# Patient Record
Sex: FEMALE | Race: WHITE | NOT HISPANIC OR LATINO | Employment: FULL TIME | URBAN - METROPOLITAN AREA
[De-identification: names, ages, dates, MRNs, and addresses within clinical notes are randomized per-mention and may not be internally consistent; named-entity substitution may affect disease eponyms.]

---

## 2022-05-19 ENCOUNTER — OFFICE VISIT (OUTPATIENT)
Dept: OBSTETRICS AND GYNECOLOGY | Facility: CLINIC | Age: 22
End: 2022-05-19
Payer: MEDICAID

## 2022-05-19 VITALS
SYSTOLIC BLOOD PRESSURE: 106 MMHG | BODY MASS INDEX: 27.88 KG/M2 | HEIGHT: 60 IN | DIASTOLIC BLOOD PRESSURE: 70 MMHG | WEIGHT: 142 LBS

## 2022-05-19 DIAGNOSIS — Z34.90 PREGNANCY, UNSPECIFIED GESTATIONAL AGE: Primary | ICD-10-CM

## 2022-05-19 DIAGNOSIS — O21.9 NAUSEA AND VOMITING IN PREGNANCY: ICD-10-CM

## 2022-05-19 PROCEDURE — 1160F PR REVIEW ALL MEDS BY PRESCRIBER/CLIN PHARMACIST DOCUMENTED: ICD-10-PCS | Mod: CPTII,S$GLB,, | Performed by: NURSE PRACTITIONER

## 2022-05-19 PROCEDURE — 1159F PR MEDICATION LIST DOCUMENTED IN MEDICAL RECORD: ICD-10-PCS | Mod: CPTII,S$GLB,, | Performed by: NURSE PRACTITIONER

## 2022-05-19 PROCEDURE — 3078F DIAST BP <80 MM HG: CPT | Mod: CPTII,S$GLB,, | Performed by: NURSE PRACTITIONER

## 2022-05-19 PROCEDURE — 3078F PR MOST RECENT DIASTOLIC BLOOD PRESSURE < 80 MM HG: ICD-10-PCS | Mod: CPTII,S$GLB,, | Performed by: NURSE PRACTITIONER

## 2022-05-19 PROCEDURE — 1160F RVW MEDS BY RX/DR IN RCRD: CPT | Mod: CPTII,S$GLB,, | Performed by: NURSE PRACTITIONER

## 2022-05-19 PROCEDURE — 3074F PR MOST RECENT SYSTOLIC BLOOD PRESSURE < 130 MM HG: ICD-10-PCS | Mod: CPTII,S$GLB,, | Performed by: NURSE PRACTITIONER

## 2022-05-19 PROCEDURE — 3008F BODY MASS INDEX DOCD: CPT | Mod: CPTII,S$GLB,, | Performed by: NURSE PRACTITIONER

## 2022-05-19 PROCEDURE — 99203 OFFICE O/P NEW LOW 30 MIN: CPT | Mod: TH,S$GLB,, | Performed by: NURSE PRACTITIONER

## 2022-05-19 PROCEDURE — 1159F MED LIST DOCD IN RCRD: CPT | Mod: CPTII,S$GLB,, | Performed by: NURSE PRACTITIONER

## 2022-05-19 PROCEDURE — 99203 PR OFFICE/OUTPT VISIT, NEW, LEVL III, 30-44 MIN: ICD-10-PCS | Mod: TH,S$GLB,, | Performed by: NURSE PRACTITIONER

## 2022-05-19 PROCEDURE — 3008F PR BODY MASS INDEX (BMI) DOCUMENTED: ICD-10-PCS | Mod: CPTII,S$GLB,, | Performed by: NURSE PRACTITIONER

## 2022-05-19 PROCEDURE — 3074F SYST BP LT 130 MM HG: CPT | Mod: CPTII,S$GLB,, | Performed by: NURSE PRACTITIONER

## 2022-05-19 RX ORDER — ONDANSETRON 4 MG/1
4 TABLET, ORALLY DISINTEGRATING ORAL EVERY 6 HOURS PRN
Qty: 30 TABLET | Refills: 3 | Status: SHIPPED | OUTPATIENT
Start: 2022-05-19 | End: 2022-05-19 | Stop reason: SDUPTHER

## 2022-05-19 RX ORDER — ONDANSETRON 4 MG/1
4 TABLET, ORALLY DISINTEGRATING ORAL EVERY 6 HOURS PRN
Qty: 30 TABLET | Refills: 3 | Status: SHIPPED | OUTPATIENT
Start: 2022-05-19 | End: 2022-08-18

## 2022-05-19 NOTE — PROGRESS NOTES
CC: Absence of menses    Boris Arambula is a 21 y.o. female  presents with complaint of absence of menstruation.  She reports nausea/vomIting/abdominal pain/bleeding.  UPT is positive. Patient's last menstrual period was 2022 (approximate). known.  Periods are last menstrual period 2022.  By LMP gestational age is 10 weeks 5 days with EDC of 12/10/2022.  Is taking PNV will start probiotics daily as well.      History reviewed. No pertinent past medical history.  History reviewed. No pertinent surgical history.  Social History     Socioeconomic History    Marital status: Single   Tobacco Use    Smoking status: Never Smoker    Smokeless tobacco: Never Used   Substance and Sexual Activity    Alcohol use: Not Currently    Drug use: Never    Sexual activity: Yes     Partners: Male     History reviewed. No pertinent family history.  OB History    Para Term  AB Living   1             SAB IAB Ectopic Multiple Live Births                  # Outcome Date GA Lbr Chito/2nd Weight Sex Delivery Anes PTL Lv   1 Current                /70   Ht 5' (1.524 m)   Wt 64.4 kg (142 lb)   LMP 2022 (Approximate)   BMI 27.73 kg/m²     ROS:  GENERAL: Denies weight gain or weight loss. Feeling well overall.   SKIN: Denies rash or lesions.   HEAD: Denies head injury or headache.   NODES: Denies enlarged lymph nodes.   CHEST: Denies chest pain or shortness of breath.   CARDIOVASCULAR: Denies palpitations or left sided chest pain.   ABDOMEN: No abdominal pain, constipation, diarrhea, nausea, vomiting or rectal bleeding.   URINARY: No frequency, dysuria, hematuria, or burning on urination.  REPRODUCTIVE: See HPI.   BREASTS: The patient performs breast self-examination and denies pain, lumps, or nipple discharge.   HEMATOLOGIC: No easy bruisability or excessive bleeding.   MUSCULOSKELETAL: Denies joint pain or swelling.   NEUROLOGIC: Denies syncope or weakness.   PSYCHIATRIC: Denies depression,  anxiety or mood swings.    PE:   APPEARANCE: Well nourished, well developed, in no acute distress.  AFFECT: WNL, alert and oriented x 3.  SKIN: No acne or hirsutism.  NECK: Neck symmetric without masses or thyromegaly.  NODES: No inguinal, cervical, axillary or femoral lymph node enlargement.  CHEST: Good respiratory effort.   ABDOMEN: Soft. No tenderness or masses. No hepatosplenomegaly. No hernias.  EXTREMITIES: No edema.      ASSESSMENT and PLAN:    ICD-10-CM ICD-9-CM    1. Pregnancy, unspecified gestational age  Z34.90 V22.2  OB/GYN Procedure ([facility])      ondansetron (ZOFRAN-ODT) 4 MG TbDL      DISCONTINUED: ondansetron (ZOFRAN-ODT) 4 MG TbDL   2. Nausea and vomiting in pregnancy  O21.9 643.90 ondansetron (ZOFRAN-ODT) 4 MG TbDL      DISCONTINUED: ondansetron (ZOFRAN-ODT) 4 MG TbDL         Patient was counseled today on proper weight gain based on the [employer] [facility]'s recommendations based on her pre-pregnancy weight. Discussed foods to avoid in pregnancy (i.e. sushi, fish that are high in mercury, deli meat, and unpasteurized cheeses). Discussed prenatal vitamin options (i.e. stool softener, DHA). Contingency screen offered - patient desires. Initial ob with labs in 2 weeks.     No follow-ups on file.    Annmarie Garrett, MARYP-C

## 2022-05-27 ENCOUNTER — PROCEDURE VISIT (OUTPATIENT)
Dept: OBSTETRICS AND GYNECOLOGY | Facility: CLINIC | Age: 22
End: 2022-05-27
Payer: MEDICAID

## 2022-05-27 DIAGNOSIS — Z34.90 PREGNANCY, UNSPECIFIED GESTATIONAL AGE: ICD-10-CM

## 2022-05-27 PROCEDURE — 76817 TRANSVAGINAL US OBSTETRIC: CPT | Mod: S$GLB,,, | Performed by: OBSTETRICS & GYNECOLOGY

## 2022-05-27 PROCEDURE — 76801 OB US < 14 WKS SINGLE FETUS: CPT | Mod: S$GLB,,, | Performed by: OBSTETRICS & GYNECOLOGY

## 2022-05-27 PROCEDURE — 76801 US OB/GYN PROCEDURE (VIEWPOINT): ICD-10-PCS | Mod: S$GLB,,, | Performed by: OBSTETRICS & GYNECOLOGY

## 2022-05-27 PROCEDURE — 76817 US OB/GYN PROCEDURE (VIEWPOINT): ICD-10-PCS | Mod: S$GLB,,, | Performed by: OBSTETRICS & GYNECOLOGY

## 2022-06-03 ENCOUNTER — PATIENT MESSAGE (OUTPATIENT)
Dept: OBSTETRICS AND GYNECOLOGY | Facility: CLINIC | Age: 22
End: 2022-06-03

## 2022-06-03 ENCOUNTER — ROUTINE PRENATAL (OUTPATIENT)
Dept: OBSTETRICS AND GYNECOLOGY | Facility: CLINIC | Age: 22
End: 2022-06-03
Payer: MEDICAID

## 2022-06-03 ENCOUNTER — LAB VISIT (OUTPATIENT)
Dept: LAB | Facility: CLINIC | Age: 22
End: 2022-06-03
Payer: MEDICAID

## 2022-06-03 VITALS
BODY MASS INDEX: 27.91 KG/M2 | DIASTOLIC BLOOD PRESSURE: 64 MMHG | WEIGHT: 142.88 LBS | HEART RATE: 84 BPM | SYSTOLIC BLOOD PRESSURE: 121 MMHG

## 2022-06-03 DIAGNOSIS — Z3A.10 10 WEEKS GESTATION OF PREGNANCY: Primary | ICD-10-CM

## 2022-06-03 DIAGNOSIS — Z3A.10 10 WEEKS GESTATION OF PREGNANCY: ICD-10-CM

## 2022-06-03 PROBLEM — Z3A.09 9 WEEKS GESTATION OF PREGNANCY: Status: ACTIVE | Noted: 2022-06-03

## 2022-06-03 LAB
ABO + RH BLD: NORMAL
AMPHET+METHAMPHET UR QL: NEGATIVE
BARBITURATES UR QL SCN>200 NG/ML: NEGATIVE
BASOPHILS # BLD AUTO: 0.04 K/UL (ref 0–0.2)
BASOPHILS NFR BLD: 0.3 % (ref 0–1.9)
BENZODIAZ UR QL SCN>200 NG/ML: NEGATIVE
BLD GP AB SCN CELLS X3 SERPL QL: NORMAL
BZE UR QL SCN: NEGATIVE
CANNABINOIDS UR QL SCN: NEGATIVE
CREAT UR-MCNC: 110.1 MG/DL (ref 15–325)
DIFFERENTIAL METHOD: ABNORMAL
EOSINOPHIL # BLD AUTO: 0.4 K/UL (ref 0–0.5)
EOSINOPHIL NFR BLD: 3.3 % (ref 0–8)
ERYTHROCYTE [DISTWIDTH] IN BLOOD BY AUTOMATED COUNT: 13.3 % (ref 11.5–14.5)
HCT VFR BLD AUTO: 39.9 % (ref 37–48.5)
HGB BLD-MCNC: 13.5 G/DL (ref 12–16)
IMM GRANULOCYTES # BLD AUTO: 0.04 K/UL (ref 0–0.04)
IMM GRANULOCYTES NFR BLD AUTO: 0.3 % (ref 0–0.5)
LYMPHOCYTES # BLD AUTO: 2.6 K/UL (ref 1–4.8)
LYMPHOCYTES NFR BLD: 22.3 % (ref 18–48)
MCH RBC QN AUTO: 28.7 PG (ref 27–31)
MCHC RBC AUTO-ENTMCNC: 33.8 G/DL (ref 32–36)
MCV RBC AUTO: 85 FL (ref 82–98)
METHADONE UR QL SCN>300 NG/ML: NEGATIVE
MONOCYTES # BLD AUTO: 0.7 K/UL (ref 0.3–1)
MONOCYTES NFR BLD: 6.2 % (ref 4–15)
NEUTROPHILS # BLD AUTO: 7.9 K/UL (ref 1.8–7.7)
NEUTROPHILS NFR BLD: 67.6 % (ref 38–73)
NRBC BLD-RTO: 0 /100 WBC
OPIATES UR QL SCN: NEGATIVE
PCP UR QL SCN>25 NG/ML: NEGATIVE
PLATELET # BLD AUTO: 397 K/UL (ref 150–450)
PMV BLD AUTO: 9.7 FL (ref 9.2–12.9)
RBC # BLD AUTO: 4.7 M/UL (ref 4–5.4)
TOXICOLOGY INFORMATION: NORMAL
WBC # BLD AUTO: 11.69 K/UL (ref 3.9–12.7)

## 2022-06-03 PROCEDURE — 99213 PR OFFICE/OUTPT VISIT, EST, LEVL III, 20-29 MIN: ICD-10-PCS | Mod: TH,S$GLB,, | Performed by: ADVANCED PRACTICE MIDWIFE

## 2022-06-03 PROCEDURE — 85660 RBC SICKLE CELL TEST: CPT | Performed by: ADVANCED PRACTICE MIDWIFE

## 2022-06-03 PROCEDURE — 86592 SYPHILIS TEST NON-TREP QUAL: CPT | Performed by: ADVANCED PRACTICE MIDWIFE

## 2022-06-03 PROCEDURE — 87186 SC STD MICRODIL/AGAR DIL: CPT | Performed by: ADVANCED PRACTICE MIDWIFE

## 2022-06-03 PROCEDURE — 87088 URINE BACTERIA CULTURE: CPT | Performed by: ADVANCED PRACTICE MIDWIFE

## 2022-06-03 PROCEDURE — 87591 N.GONORRHOEAE DNA AMP PROB: CPT | Performed by: ADVANCED PRACTICE MIDWIFE

## 2022-06-03 PROCEDURE — 87491 CHLMYD TRACH DNA AMP PROBE: CPT | Performed by: ADVANCED PRACTICE MIDWIFE

## 2022-06-03 PROCEDURE — 80307 DRUG TEST PRSMV CHEM ANLYZR: CPT | Performed by: ADVANCED PRACTICE MIDWIFE

## 2022-06-03 PROCEDURE — 99213 OFFICE O/P EST LOW 20 MIN: CPT | Mod: TH,S$GLB,, | Performed by: ADVANCED PRACTICE MIDWIFE

## 2022-06-03 PROCEDURE — 87389 HIV-1 AG W/HIV-1&-2 AB AG IA: CPT | Performed by: ADVANCED PRACTICE MIDWIFE

## 2022-06-03 PROCEDURE — 85025 COMPLETE CBC W/AUTO DIFF WBC: CPT | Performed by: ADVANCED PRACTICE MIDWIFE

## 2022-06-03 PROCEDURE — 36415 COLL VENOUS BLD VENIPUNCTURE: CPT | Mod: ,,, | Performed by: ADVANCED PRACTICE MIDWIFE

## 2022-06-03 PROCEDURE — 86901 BLOOD TYPING SEROLOGIC RH(D): CPT | Performed by: ADVANCED PRACTICE MIDWIFE

## 2022-06-03 PROCEDURE — 80074 ACUTE HEPATITIS PANEL: CPT | Performed by: ADVANCED PRACTICE MIDWIFE

## 2022-06-03 PROCEDURE — 86762 RUBELLA ANTIBODY: CPT | Performed by: ADVANCED PRACTICE MIDWIFE

## 2022-06-03 PROCEDURE — 87077 CULTURE AEROBIC IDENTIFY: CPT | Performed by: ADVANCED PRACTICE MIDWIFE

## 2022-06-03 PROCEDURE — 36415 PR COLLECTION VENOUS BLOOD,VENIPUNCTURE: ICD-10-PCS | Mod: ,,, | Performed by: ADVANCED PRACTICE MIDWIFE

## 2022-06-03 PROCEDURE — 87086 URINE CULTURE/COLONY COUNT: CPT | Performed by: ADVANCED PRACTICE MIDWIFE

## 2022-06-03 NOTE — PROGRESS NOTES
6/3/2022  21 y.o.  at 10w0d Estimated Date of Delivery: 22, per US at 9 weeks. EDC changed per US.  OB History    Para Term  AB Living   1             SAB IAB Ectopic Multiple Live Births                  # Outcome Date GA Lbr Chito/2nd Weight Sex Delivery Anes PTL Lv   1 Current                Here for scheduled TERRENCE visit. Doing well.  No lof/brvb, dysuria, fever/chills, or abdominal pain. Mild nausea. Calm, pleasant, NAD. ROS negative with exception of aforementioned:    History  OBHX:    PMXHX:  Denies major illness/injury  ALLERGY:  Amoxicillin  SURGHX:  None  SOCHX:  FOB involved  Denies tobacco, etoh or substance use in pregnancy.    Review of Systems:  General ROS: negative for headache or visual changes  Breast ROS: negative for breast lumps  Gastrointestinal ROS: negative for constipation, diarrhea or nausea/vomiting  Musculoskeletal ROS: negative for pain in joints or swelling in face or hands.   Neurological ROS: negative for - headaches, numbness/tingling or visual changes      Physical Exam:  /64   Pulse 84   Wt 64.8 kg (142 lb 14.4 oz)   LMP 2022 (Approximate)   BMI 27.91 kg/m²   FHT:  160s    Constitutional: She is oriented to person, place, and time. She appears well-developed and well-nourished. No distress.   Pulmonary/Chest: Effort normal. No respiratory distress  Abdominal: Soft, gravid, nontender. No rebound and no guarding. Fundal Height:  S=D  Genitourinary: Deferred   Musculoskeletal: Normal range of motion. Minimal peripheral edema.   Neurological: She is alert and oriented to person, place, and time. Coordination normal. Gait smooth and steady  Skin: Skin is warm and dry. She is not diaphoretic.  Psychiatric: She has a normal mood and affect.      Assessment:   21 y.o., at 10w0d Gestation   Patient Active Problem List   Diagnosis    9 weeks gestation of pregnancy    10 weeks gestation of pregnancy     Current Outpatient Medications on File  Prior to Visit   Medication Sig Dispense Refill    ondansetron (ZOFRAN-ODT) 4 MG TbDL Take 1 tablet (4 mg total) by mouth every 6 (six) hours as needed (nausea). 30 tablet 3     No current facility-administered medications on file prior to visit.       Plan:  1. S&S of SAB reinforced.  2. Discussed our collaborative practice and CNM care.  3. Continue home meds/daily PNV.  4. All new ob labs with Pan[facility]/desires.  5. TERRENCE in 3 weeks.

## 2022-06-04 LAB
HGB S BLD QL SOLY: NEGATIVE
RPR SER QL: NORMAL

## 2022-06-05 LAB
C TRACH DNA SPEC QL NAA+PROBE: NOT DETECTED
N GONORRHOEA DNA SPEC QL NAA+PROBE: NOT DETECTED

## 2022-06-06 ENCOUNTER — TELEPHONE (OUTPATIENT)
Dept: OBSTETRICS AND GYNECOLOGY | Facility: CLINIC | Age: 22
End: 2022-06-06
Payer: MEDICAID

## 2022-06-06 DIAGNOSIS — O23.40 URINARY TRACT INFECTION IN MOTHER DURING PREGNANCY, ANTEPARTUM: Primary | ICD-10-CM

## 2022-06-06 LAB
BACTERIA UR CULT: ABNORMAL
HAV IGM SERPL QL IA: NEGATIVE
HBV CORE IGM SERPL QL IA: NEGATIVE
HBV SURFACE AG SERPL QL IA: NEGATIVE
HCV AB SERPL QL IA: NEGATIVE
HIV 1+2 AB+HIV1 P24 AG SERPL QL IA: NEGATIVE
RUBV IGG SER-ACNC: 16.5 IU/ML
RUBV IGG SER-IMP: REACTIVE

## 2022-06-06 RX ORDER — NITROFURANTOIN 25; 75 MG/1; MG/1
100 CAPSULE ORAL 2 TIMES DAILY
Qty: 14 CAPSULE | Refills: 0 | Status: SHIPPED | OUTPATIENT
Start: 2022-06-06 | End: 2022-06-13

## 2022-06-06 NOTE — TELEPHONE ENCOUNTER
Attempted to call patient and the phone on file is disconnected     ----- Message from Viridiana Hanson CNM sent at 6/6/2022  1:32 PM CDT -----  Please inform patient of UTI. RX for Macrobid has been sent to pharmacy on file for  asap.

## 2022-06-17 ENCOUNTER — PATIENT MESSAGE (OUTPATIENT)
Dept: OBSTETRICS AND GYNECOLOGY | Facility: CLINIC | Age: 22
End: 2022-06-17
Payer: MEDICAID

## 2022-06-23 ENCOUNTER — TELEPHONE (OUTPATIENT)
Dept: OBSTETRICS AND GYNECOLOGY | Facility: CLINIC | Age: 22
End: 2022-06-23

## 2022-06-29 ENCOUNTER — PATIENT MESSAGE (OUTPATIENT)
Dept: OBSTETRICS AND GYNECOLOGY | Facility: CLINIC | Age: 22
End: 2022-06-29

## 2022-06-29 ENCOUNTER — ROUTINE PRENATAL (OUTPATIENT)
Dept: OBSTETRICS AND GYNECOLOGY | Facility: CLINIC | Age: 22
End: 2022-06-29
Payer: MEDICAID

## 2022-06-29 VITALS — SYSTOLIC BLOOD PRESSURE: 110 MMHG | BODY MASS INDEX: 28.22 KG/M2 | WEIGHT: 144.5 LBS | DIASTOLIC BLOOD PRESSURE: 61 MMHG

## 2022-06-29 DIAGNOSIS — Z3A.13 13 WEEKS GESTATION OF PREGNANCY: Primary | ICD-10-CM

## 2022-06-29 PROCEDURE — 99213 PR OFFICE/OUTPT VISIT, EST, LEVL III, 20-29 MIN: ICD-10-PCS | Mod: TH,S$GLB,, | Performed by: NURSE PRACTITIONER

## 2022-06-29 PROCEDURE — 99213 OFFICE O/P EST LOW 20 MIN: CPT | Mod: TH,S$GLB,, | Performed by: NURSE PRACTITIONER

## 2022-06-29 RX ORDER — NITROFURANTOIN 25; 75 MG/1; MG/1
100 CAPSULE ORAL 2 TIMES DAILY
Qty: 14 CAPSULE | Refills: 0 | Status: SHIPPED | OUTPATIENT
Start: 2022-06-29 | End: 2022-07-06

## 2022-06-29 NOTE — PROGRESS NOTES
2022  22 y.o. 13w5d Estimated Date of Delivery: 22, dating reviewed.   OB History    Para Term  AB Living   1             SAB IAB Ectopic Multiple Live Births                  # Outcome Date GA Lbr Chito/2nd Weight Sex Delivery Anes PTL Lv   1 Current              The patient presents with complaints of   Chief Complaint   Patient presents with    Routine Prenatal Visit     Pt is here for her Routine OB [id number].Pt has no complaints.       Reports: Good fetal movements reported. No Bleeding or contractions .  She is taking prenatal vitamins. Ms. Arambula   is adjusting well and has a good support system of family and friends. She is coping with pregnancy and having no difficulty with sleep.    Prenatal labs done 6/3/22    Review of Systems:  General ROS: negative for headache or visual changes  Breast ROS: negative for breast lumps  Gastrointestinal ROS: negative for constipation, diarrhea or nausea/vomiting  Musculoskeletal ROS: negative for pain in joints or swelling in face or hands.   Neurological ROS: negative for - headaches, numbness/tingling or visual changes      Physical Exam:  /61   Wt 65.5 kg (144 lb 8 oz)   LMP 2022 (Approximate)   BMI 28.22 kg/m²   Urine Dip:  Protein negative Glucose negative    Constitutional: She is oriented to person, place, and time. She appears well-developed and well-nourished. No distress.     Pulmonary/Chest: Effort normal. No respiratory distress  Abdominal: Soft, gravid, nontender. No rebound and no guarding.   Genitourinary: Deferred   Musculoskeletal: Normal range of motion, Minimal peripheral edema.   Neurological: She is alert and oriented to person, place, and time. Coordination normal.   Skin: Skin is warm and dry. She is not diaphoretic.  Psychiatric: She has a normal mood and affect.        Assessment:  Overall doing well.   22 y.o., at 13w5d Gestation   Patient Active Problem List   Diagnosis    9 weeks gestation of pregnancy     10 weeks gestation of pregnancy    Urinary tract infection in mother during pregnancy, antepartum     Current Outpatient Medications on File Prior to Visit   Medication Sig Dispense Refill    ondansetron (ZOFRAN-ODT) 4 MG TbDL Take 1 tablet (4 mg total) by mouth every 6 (six) hours as needed (nausea). 30 tablet 3     No current facility-administered medications on file prior to visit.       13 weeks gestation of pregnancy         Plan:  Overall doing well    Follow up 3 Weeks, bleeding/pain precautions, kick counts, labor precautions discussed. Will get Macrobid for UTI.  TERRENCE in 3 weeks with Ms Kemp.     Annmarie Garrett, FNP-C

## 2022-07-14 ENCOUNTER — PATIENT MESSAGE (OUTPATIENT)
Dept: OBSTETRICS AND GYNECOLOGY | Facility: CLINIC | Age: 22
End: 2022-07-14
Payer: MEDICAID

## 2022-07-21 ENCOUNTER — ROUTINE PRENATAL (OUTPATIENT)
Dept: OBSTETRICS AND GYNECOLOGY | Facility: CLINIC | Age: 22
End: 2022-07-21
Payer: MEDICAID

## 2022-07-21 VITALS — WEIGHT: 148 LBS | SYSTOLIC BLOOD PRESSURE: 118 MMHG | DIASTOLIC BLOOD PRESSURE: 70 MMHG | BODY MASS INDEX: 28.9 KG/M2

## 2022-07-21 DIAGNOSIS — Z3A.16 16 WEEKS GESTATION OF PREGNANCY: Primary | ICD-10-CM

## 2022-07-21 PROBLEM — Z3A.10 10 WEEKS GESTATION OF PREGNANCY: Status: RESOLVED | Noted: 2022-06-03 | Resolved: 2022-07-21

## 2022-07-21 PROBLEM — Z3A.09 9 WEEKS GESTATION OF PREGNANCY: Status: RESOLVED | Noted: 2022-06-03 | Resolved: 2022-07-21

## 2022-07-21 PROCEDURE — 59425 ANTEPARTUM CARE ONLY: CPT | Mod: TH,S$GLB,, | Performed by: ADVANCED PRACTICE MIDWIFE

## 2022-07-21 PROCEDURE — 87086 URINE CULTURE/COLONY COUNT: CPT | Performed by: ADVANCED PRACTICE MIDWIFE

## 2022-07-21 PROCEDURE — 59425 PR ANTEPARTUM CARE ONLY, 4-6 VISITS: ICD-10-PCS | Mod: TH,S$GLB,, | Performed by: ADVANCED PRACTICE MIDWIFE

## 2022-07-21 RX ORDER — LORATADINE 10 MG/1
10 TABLET ORAL
COMMUNITY

## 2022-07-21 NOTE — PROGRESS NOTES
2022  22 y.o.  at 16 + 6/7 d Estimated Date of Delivery: 22, per US at 9 weeks. EDC changed per US.  OB History    Para Term  AB Living   1             SAB IAB Ectopic Multiple Live Births                  # Outcome Date GA Lbr Chito/2nd Weight Sex Delivery Anes PTL Lv   1 Current                Here for scheduled TERRENCE visit. Doing well.  No lof/brvb, dysuria, fever/chills, or abdominal pain. + quickening. Calm, pleasant, NAD. ROS negative with exception of aforementioned:    History  OBHX:    + UTI, porsche=  PMXHX:  Denies major illness/injury  ALLERGY:  Amoxicillin  SURGHX:  None  SOCHX:  FOB involved  Denies tobacco, etoh or substance use in pregnancy.    LABS:  O pos abs neg  HIV neg  Rubella immune  RPR non reactive  Sickle Screen neg  HEP A B C neg  +UTI, porsche =  Neg GC CHL  Panorama low risk male x 3  AFP pending      Review of Systems:  General ROS: negative for headache or visual changes  Breast ROS: negative for breast lumps  Gastrointestinal ROS: negative for constipation, diarrhea or nausea/vomiting  Musculoskeletal ROS: negative for pain in joints or swelling in face or hands.   Neurological ROS: negative for - headaches, numbness/tingling or visual changes      Physical Exam:  /70   Wt 67.1 kg (148 lb)   LMP 2022 (Approximate)   BMI 28.90 kg/m²   FHT: Fetal Heart Rate: [id number]    Constitutional: She is oriented to person, place, and time. She appears well-developed and well-nourished. No distress.   Pulmonary/Chest: Effort normal. No respiratory distress  Abdominal: Soft, gravid, nontender. No rebound and no guarding. Fundal Height:  S=D 3 below U  Genitourinary: Deferred   Musculoskeletal: Normal range of motion. Minimal peripheral edema.   Neurological: She is alert and oriented to person, place, and time. Coordination normal. Gait smooth and steady  Skin: Skin is warm and dry. She is not diaphoretic.  Psychiatric: She has a normal mood and  affect.      Assessment:   22 y.o., at 16 +6w0d Gestation   Patient Active Problem List   Diagnosis    Urinary tract infection in mother during pregnancy, antepartum     Current Outpatient Medications on File Prior to Visit   Medication Sig Dispense Refill    loratadine (CLARITIN) 10 mg tablet Take 10 mg by mouth.      prenatal vit/iron fum/folic ac (PRENATAL 1+1 ORAL) Take by mouth.      ondansetron (ZOFRAN-ODT) 4 MG TbDL Take 1 tablet (4 mg total) by mouth every 6 (six) hours as needed (nausea). (Patient not taking: Reported on 7/21/2022) 30 tablet 3     No current facility-administered medications on file prior to visit.       Plan:  1. S&S of SAB reinforced.  2.   Continue daily PNV.  3.   AFP today, discussed.  4.  VINH UTI, urine cx today.  5.   TERRENCE 4 weeks with dating US/discussed.

## 2022-07-24 LAB — BACTERIA UR CULT: NO GROWTH

## 2022-08-17 ENCOUNTER — PATIENT MESSAGE (OUTPATIENT)
Dept: OBSTETRICS AND GYNECOLOGY | Facility: CLINIC | Age: 22
End: 2022-08-17
Payer: MEDICAID

## 2022-08-18 ENCOUNTER — PROCEDURE VISIT (OUTPATIENT)
Dept: MATERNAL FETAL MEDICINE | Facility: CLINIC | Age: 22
End: 2022-08-18
Payer: MEDICAID

## 2022-08-18 ENCOUNTER — ROUTINE PRENATAL (OUTPATIENT)
Dept: OBSTETRICS AND GYNECOLOGY | Facility: CLINIC | Age: 22
End: 2022-08-18
Payer: MEDICAID

## 2022-08-18 ENCOUNTER — PATIENT MESSAGE (OUTPATIENT)
Dept: OBSTETRICS AND GYNECOLOGY | Facility: CLINIC | Age: 22
End: 2022-08-18

## 2022-08-18 VITALS — WEIGHT: 156.5 LBS | DIASTOLIC BLOOD PRESSURE: 67 MMHG | SYSTOLIC BLOOD PRESSURE: 120 MMHG | BODY MASS INDEX: 30.56 KG/M2

## 2022-08-18 DIAGNOSIS — Z3A.16 16 WEEKS GESTATION OF PREGNANCY: ICD-10-CM
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
## 2022-08-18 DIAGNOSIS — Z3A.20 20 WEEKS GESTATION OF PREGNANCY: Primary | ICD-10-CM

## 2022-08-18 PROBLEM — O23.40 URINARY TRACT INFECTION IN MOTHER DURING PREGNANCY, ANTEPARTUM: Status: RESOLVED | Noted: 2022-06-06 | Resolved: 2022-08-18

## 2022-08-18 PROCEDURE — 59425 ANTEPARTUM CARE ONLY: CPT | Mod: TH,S$GLB,,

## 2022-08-18 PROCEDURE — 76805 OB US >/= 14 WKS SNGL FETUS: CPT | Mod: S$GLB,,, | Performed by: OBSTETRICS & GYNECOLOGY

## 2022-08-18 PROCEDURE — 76805 US OB/GYN PROCEDURE (VIEWPOINT): ICD-10-PCS | Mod: S$GLB,,, | Performed by: OBSTETRICS & GYNECOLOGY

## 2022-08-18 PROCEDURE — 90715 TDAP VACCINE 7 YRS/> IM: CPT | Mod: S$GLB,,, | Performed by: OBSTETRICS & GYNECOLOGY

## 2022-08-18 PROCEDURE — 59425 PR ANTEPARTUM CARE ONLY, 4-6 VISITS: ICD-10-PCS | Mod: TH,S$GLB,,

## 2022-08-18 PROCEDURE — 90715 PR TDAP VACCINE >7 YO, IM: ICD-10-PCS | Mod: S$GLB,,, | Performed by: OBSTETRICS & GYNECOLOGY

## 2022-08-18 PROCEDURE — 90471 IMMUNIZATION ADMIN: CPT | Mod: S$GLB,,, | Performed by: OBSTETRICS & GYNECOLOGY

## 2022-08-18 PROCEDURE — 90471 PR IMMUNIZ ADMIN,1 SINGLE/COMB VAC/TOXOID: ICD-10-PCS | Mod: S$GLB,,, | Performed by: OBSTETRICS & GYNECOLOGY

## 2022-08-18 NOTE — PROGRESS NOTES
2022  22 y.o.  at 20 + 6/7 d Estimated Date of Delivery: 22, per US at 9 weeks. EDC changed per US.  OB History    Para Term  AB Living   1             SAB IAB Ectopic Multiple Live Births                  # Outcome Date GA Lbr Chito/2nd Weight Sex Delivery Anes PTL Lv   1 Current                Here for scheduled TERRENCE visit. Doing well.  No lof/brvb, dysuria, fever/chills, or abdominal pain. + quickening. Calm, pleasant, NAD. ROS negative with exception of aforementioned:    Pt accidentally stuck herself with heartworm testing needle at work. TDAP today. No STD testing required as it was for animal heartworm testing.     History  OBHX:    + UTI, porcshe= no growth    PMXHX:  Denies major illness/injury    ALLERGY:  Amoxicillin    SURGHX:  None    SOCHX:  FOB involved  Denies tobacco, etoh or substance use in pregnancy.    LABS:  O pos abs neg  HIV neg  Rubella immune  RPR non reactive  Sickle Screen neg  HEP A B C neg  +UTI, porsche = no growth  Neg GC CHL  Panorama low risk male x 3  AFP negative      Review of Systems:  General ROS: negative for headache or visual changes  Breast ROS: negative for breast lumps  Gastrointestinal ROS: negative for constipation, diarrhea or nausea/vomiting  Musculoskeletal ROS: negative for pain in joints or swelling in face or hands.   Neurological ROS: negative for - headaches, numbness/tingling or visual changes      Physical Exam:  /67   Wt 71 kg (156 lb 8 oz)   LMP 2022 (Approximate)   BMI 30.56 kg/m²   FHT:  150s    Constitutional: She is oriented to person, place, and time. She appears well-developed and well-nourished. No distress.   Pulmonary/Chest: Effort normal. No respiratory distress  Abdominal: Soft, gravid, nontender. No rebound and no guarding. Fundal Height:  S=D 3 below U  Genitourinary: Deferred   Musculoskeletal: Normal range of motion. Minimal peripheral edema.   Neurological: She is alert and oriented to person,  place, and time. Coordination normal. Gait smooth and steady  Skin: Skin is warm and dry. She is not diaphoretic.  Psychiatric: She has a normal mood and affect.      Assessment:   22 y.o., at 16 +6w0d Gestation   Patient Active Problem List   Diagnosis    Urinary tract infection in mother during pregnancy, antepartum     Current Outpatient Medications on File Prior to Visit   Medication Sig Dispense Refill    loratadine (CLARITIN) 10 mg tablet Take 10 mg by mouth.      ondansetron (ZOFRAN-ODT) 4 MG TbDL Take 1 tablet (4 mg total) by mouth every 6 (six) hours as needed (nausea). (Patient not taking: Reported on 7/21/2022) 30 tablet 3    prenatal vit/iron fum/folic ac (PRENATAL 1+1 ORAL) Take by mouth.       No current facility-administered medications on file prior to visit.       Plan:  1. S&S of SAB reinforced.  2.   Continue daily PNV.  3.   AFP negative  4.   Anatomy u/s today, preliminary report WNL.   5.   Pt has needle stick injury from used heartworm needle at work. TDAP today. Needle stick precautions reviewed.   5.   TERRENCE 4 weeks.

## 2022-09-16 ENCOUNTER — ROUTINE PRENATAL (OUTPATIENT)
Dept: OBSTETRICS AND GYNECOLOGY | Facility: CLINIC | Age: 22
End: 2022-09-16
Payer: MEDICAID

## 2022-09-16 VITALS
BODY MASS INDEX: 31.68 KG/M2 | DIASTOLIC BLOOD PRESSURE: 64 MMHG | SYSTOLIC BLOOD PRESSURE: 124 MMHG | WEIGHT: 162.19 LBS

## 2022-09-16 DIAGNOSIS — Z3A.25 25 WEEKS GESTATION OF PREGNANCY: Primary | ICD-10-CM

## 2022-09-16 DIAGNOSIS — O44.40 LOW-LYING PLACENTA: ICD-10-CM
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
## 2022-09-16 PROBLEM — Z3A.20 20 WEEKS GESTATION OF PREGNANCY: Status: RESOLVED | Noted: 2022-08-18 | Resolved: 2022-09-16

## 2022-09-16 PROCEDURE — 59425 ANTEPARTUM CARE ONLY: CPT | Mod: TH,S$GLB,,

## 2022-09-16 PROCEDURE — 59425 PR ANTEPARTUM CARE ONLY, 4-6 VISITS: ICD-10-PCS | Mod: TH,S$GLB,,

## 2022-09-16 NOTE — PROGRESS NOTES
2022  22 y.o.  at 25 + 0/7 d Estimated Date of Delivery: 22, per US at 9 weeks. EDC changed per US.  OB History    Para Term  AB Living   1             SAB IAB Ectopic Multiple Live Births                  # Outcome Date GA Lbr Chito/2nd Weight Sex Delivery Anes PTL Lv   1 Current                Here for scheduled TERRENCE visit. Doing well.  No lof/brvb, dysuria, fever/chills, or abdominal pain. + quickening. Calm, pleasant, NAD. ROS negative with exception of aforementioned:    Pt accidentally stuck herself with heartworm testing needle at work. TDAP today. No STD testing required as it was for animal heartworm testing.     Anatomy scan shows low lying placenta, 1.3 cm from cervical os. Pelvic rest reviewed. Bleeding precautions reviewed. Pt denies VB. We discussed follow up at 32 weeks for low lying placenta.     History  OBHX:    + UTI, porsche= no growth    PMXHX:  Denies major illness/injury    ALLERGY:  Amoxicillin    SURGHX:  None    SOCHX:  FOB involved  Denies tobacco, etoh or substance use in pregnancy.    LABS:  O pos abs neg  HIV neg  Rubella immune  RPR non reactive  Sickle Screen neg  HEP A B C neg  +UTI, porsche = no growth  Neg GC CHL  Panorama low risk male x 3  AFP negative      Review of Systems:  General ROS: negative for headache or visual changes  Breast ROS: negative for breast lumps  Gastrointestinal ROS: negative for constipation, diarrhea or nausea/vomiting  Musculoskeletal ROS: negative for pain in joints or swelling in face or hands.   Neurological ROS: negative for - headaches, numbness/tingling or visual changes      Physical Exam:  /64   Wt 73.6 kg (162 lb 3.2 oz)   LMP 2022 (Approximate)   BMI 31.68 kg/m²   FHT: Fetal Heart Rate: 130s    Constitutional: She is oriented to person, place, and time. She appears well-developed and well-nourished. No distress.   Pulmonary/Chest: Effort normal. No respiratory distress  Abdominal: Soft, gravid,  nontender. No rebound and no guarding. Fundal Height: Fundal Height (cm): 25 cmS=D   Genitourinary: Deferred   Musculoskeletal: Normal range of motion. Minimal peripheral edema.   Neurological: She is alert and oriented to person, place, and time. Coordination normal. Gait smooth and steady  Skin: Skin is warm and dry. She is not diaphoretic.  Psychiatric: She has a normal mood and affect.      Assessment:   22 y.o., at 25w0d Gestation   Patient Active Problem List   Diagnosis    Low-lying placenta    25 weeks gestation of pregnancy     Current Outpatient Medications on File Prior to Visit   Medication Sig Dispense Refill    loratadine (CLARITIN) 10 mg tablet Take 10 mg by mouth.      prenatal vit/iron fum/folic ac (PRENATAL 1+1 ORAL) Take by mouth.       No current facility-administered medications on file prior to visit.       Plan:  1. S&S of PTL/PPROM reinforced.  2.   Continue daily PNV.  3.   AFP negative  4.   Anatomy u/s shows low lying placenta, 1.3 cm from os. Pelvic rest and bleeding precautions reviewed.   5.   GTT and CBC at next visit.  6.   TERRENCE 3 weeks.

## 2022-09-19 ENCOUNTER — PATIENT MESSAGE (OUTPATIENT)
Dept: OBSTETRICS AND GYNECOLOGY | Facility: CLINIC | Age: 22
End: 2022-09-19
Payer: MEDICAID

## 2022-10-06 ENCOUNTER — LAB VISIT (OUTPATIENT)
Dept: LAB | Facility: CLINIC | Age: 22
End: 2022-10-06
Payer: MEDICAID

## 2022-10-06 ENCOUNTER — ROUTINE PRENATAL (OUTPATIENT)
Dept: OBSTETRICS AND GYNECOLOGY | Facility: CLINIC | Age: 22
End: 2022-10-06
Payer: MEDICAID

## 2022-10-06 VITALS
BODY MASS INDEX: 32.63 KG/M2 | DIASTOLIC BLOOD PRESSURE: 74 MMHG | WEIGHT: 167.13 LBS | SYSTOLIC BLOOD PRESSURE: 124 MMHG

## 2022-10-06 DIAGNOSIS — Z3A.27 27 WEEKS GESTATION OF PREGNANCY: Primary | ICD-10-CM

## 2022-10-06 DIAGNOSIS — O44.40 LOW-LYING PLACENTA: ICD-10-CM

## 2022-10-06 DIAGNOSIS — Z3A.25 25 WEEKS GESTATION OF PREGNANCY: ICD-10-CM

## 2022-10-06 LAB
BASOPHILS # BLD AUTO: 0.05 K/UL (ref 0–0.2)
BASOPHILS NFR BLD: 0.4 % (ref 0–1.9)
DIFFERENTIAL METHOD: ABNORMAL
EOSINOPHIL # BLD AUTO: 0.5 K/UL (ref 0–0.5)
EOSINOPHIL NFR BLD: 4 % (ref 0–8)
ERYTHROCYTE [DISTWIDTH] IN BLOOD BY AUTOMATED COUNT: 13.2 % (ref 11.5–14.5)
GLUCOSE SERPL-MCNC: 116 MG/DL (ref 70–140)
HCT VFR BLD AUTO: 35.2 % (ref 37–48.5)
HGB BLD-MCNC: 11.7 G/DL (ref 12–16)
IMM GRANULOCYTES # BLD AUTO: 0.07 K/UL (ref 0–0.04)
IMM GRANULOCYTES NFR BLD AUTO: 0.5 % (ref 0–0.5)
LYMPHOCYTES # BLD AUTO: 2 K/UL (ref 1–4.8)
LYMPHOCYTES NFR BLD: 15.6 % (ref 18–48)
MCH RBC QN AUTO: 29.5 PG (ref 27–31)
MCHC RBC AUTO-ENTMCNC: 33.2 G/DL (ref 32–36)
MCV RBC AUTO: 89 FL (ref 82–98)
MONOCYTES # BLD AUTO: 0.6 K/UL (ref 0.3–1)
MONOCYTES NFR BLD: 5 % (ref 4–15)
NEUTROPHILS # BLD AUTO: 9.6 K/UL (ref 1.8–7.7)
NEUTROPHILS NFR BLD: 74.5 % (ref 38–73)
NRBC BLD-RTO: 0 /100 WBC
PLATELET # BLD AUTO: 321 K/UL (ref 150–450)
PMV BLD AUTO: 10.2 FL (ref 9.2–12.9)
RBC # BLD AUTO: 3.97 M/UL (ref 4–5.4)
WBC # BLD AUTO: 12.85 K/UL (ref 3.9–12.7)

## 2022-10-06 PROCEDURE — 36415 PR COLLECTION VENOUS BLOOD,VENIPUNCTURE: ICD-10-PCS | Mod: ,,, | Performed by: STUDENT IN AN ORGANIZED HEALTH CARE EDUCATION/TRAINING PROGRAM

## 2022-10-06 PROCEDURE — 36415 COLL VENOUS BLD VENIPUNCTURE: CPT | Mod: ,,, | Performed by: STUDENT IN AN ORGANIZED HEALTH CARE EDUCATION/TRAINING PROGRAM

## 2022-10-06 PROCEDURE — 59426 PR ANTEPARTUM CARE ONLY, >7 VISITS: ICD-10-PCS | Mod: TH,S$GLB,,

## 2022-10-06 PROCEDURE — 59426 ANTEPARTUM CARE ONLY: CPT | Mod: TH,S$GLB,,

## 2022-10-06 PROCEDURE — 82950 GLUCOSE TEST: CPT

## 2022-10-06 PROCEDURE — 85025 COMPLETE CBC W/AUTO DIFF WBC: CPT

## 2022-10-06 NOTE — PROGRESS NOTES
10/6/2022  22 y.o.  at 27 + 6/7 d Estimated Date of Delivery: 22, per US at 9 weeks. EDC changed per US.  OB History    Para Term  AB Living   1             SAB IAB Ectopic Multiple Live Births                  # Outcome Date GA Lbr Chito/2nd Weight Sex Delivery Anes PTL Lv   1 Current                Here for scheduled TERRENCE visit. Doing well.  No lof/brvb, dysuria, fever/chills, or abdominal pain. + FM. Calm, pleasant, NAD. ROS negative with exception of aforementioned:    Pt accidentally stuck herself with heartworm testing needle at work. TDAP at 20 weeks EGA. No STD testing required as it was for animal heartworm testing.     Anatomy scan shows low lying placenta, 1.3 cm from cervical os. Pelvic rest reviewed. Bleeding precautions reviewed. Pt denies VB. We discussed follow up at 32 weeks for low lying placenta. Scheduled for 22.     History  OBHX:    + UTI, porsche= no growth    PMXHX:  Denies major illness/injury    ALLERGY:  Amoxicillin    SURGHX:  None    SOCHX:  FOB involved  Denies tobacco, etoh or substance use in pregnancy.    LABS:  O pos abs neg  HIV neg  Rubella immune  RPR non reactive  Sickle Screen neg  HEP A B C neg  +UTI, porsche = no growth  Neg GC CHL  Panorama low risk male x 3  AFP negative  CBC/GTT today      Review of Systems:  General ROS: negative for headache or visual changes  Breast ROS: negative for breast lumps  Gastrointestinal ROS: negative for constipation, diarrhea or nausea/vomiting  Musculoskeletal ROS: negative for pain in joints or swelling in face or hands.   Neurological ROS: negative for - headaches, numbness/tingling or visual changes      Physical Exam:  /74   Wt 75.8 kg (167 lb 1.6 oz)   LMP 2022 (Approximate)   BMI 32.63 kg/m²   FHT: Fetal Heart Rate: 140s    Constitutional: She is oriented to person, place, and time. She appears well-developed and well-nourished. No distress.   Pulmonary/Chest: Effort normal. No respiratory  distress  Abdominal: Soft, gravid, nontender. No rebound and no guarding. Fundal Height: Fundal Height (cm): 28 cmS=D   Genitourinary: Deferred   Musculoskeletal: Normal range of motion. Minimal peripheral edema.   Neurological: She is alert and oriented to person, place, and time. Coordination normal. Gait smooth and steady  Skin: Skin is warm and dry. She is not diaphoretic.  Psychiatric: She has a normal mood and affect.      Assessment:   22 y.o., at 27w6d Gestation   Patient Active Problem List   Diagnosis    Low-lying placenta    27 weeks gestation of pregnancy     Current Outpatient Medications on File Prior to Visit   Medication Sig Dispense Refill    loratadine (CLARITIN) 10 mg tablet Take 10 mg by mouth.      prenatal vit/iron fum/folic ac (PRENATAL 1+1 ORAL) Take by mouth.       No current facility-administered medications on file prior to visit.       Plan:  S&S of PTL/PPROM reinforced.  2.   Continue daily PNV.  3.   AFP negative  4.   Anatomy u/s shows low lying placenta, 1.3 cm from os. Pelvic rest and bleeding precautions reviewed. Low lying placenta follow up on 11/4/22.  5.   GTT and CBC at today.  6.   TERRENCE 2 weeks.

## 2022-10-07 ENCOUNTER — PATIENT MESSAGE (OUTPATIENT)
Dept: OBSTETRICS AND GYNECOLOGY | Facility: CLINIC | Age: 22
End: 2022-10-07
Payer: MEDICAID

## 2022-10-07 ENCOUNTER — PATIENT MESSAGE (OUTPATIENT)
Dept: ADMINISTRATIVE | Facility: OTHER | Age: 22
End: 2022-10-07
Payer: MEDICAID

## 2022-10-17 ENCOUNTER — HOSPITAL ENCOUNTER (OUTPATIENT)
Facility: HOSPITAL | Age: 22
Discharge: HOME | End: 2022-10-17
Attending: SPECIALIST | Admitting: SPECIALIST
Payer: MEDICAID

## 2022-10-17 ENCOUNTER — PATIENT MESSAGE (OUTPATIENT)
Dept: OBSTETRICS AND GYNECOLOGY | Facility: CLINIC | Age: 22
End: 2022-10-17
Payer: MEDICAID

## 2022-10-17 VITALS — TEMPERATURE: 98 F | RESPIRATION RATE: 18 BRPM

## 2022-10-17 DIAGNOSIS — R60.9 SWELLING: ICD-10-CM

## 2022-10-17 LAB
BILIRUB UR QL STRIP: NEGATIVE
CLARITY UR: CLEAR
COLOR UR: YELLOW
GLUCOSE UR QL STRIP: NEGATIVE
HGB UR QL STRIP: NEGATIVE
KETONES UR QL STRIP: ABNORMAL
LEUKOCYTE ESTERASE UR QL STRIP: NEGATIVE
NITRITE UR QL STRIP: NEGATIVE
PH UR STRIP: 7 [PH] (ref 5–8)
PROT UR QL STRIP: NEGATIVE
SP GR UR STRIP: 1.01 (ref 1–1.03)
URN SPEC COLLECT METH UR: ABNORMAL
UROBILINOGEN UR STRIP-ACNC: NEGATIVE EU/DL

## 2022-10-17 PROCEDURE — 81003 URINALYSIS AUTO W/O SCOPE: CPT | Performed by: SPECIALIST

## 2022-10-17 PROCEDURE — 25000003 PHARM REV CODE 250: Performed by: SPECIALIST

## 2022-10-17 PROCEDURE — 99211 OFF/OP EST MAY X REQ PHY/QHP: CPT

## 2022-10-17 RX ORDER — ACETAMINOPHEN 500 MG
1000 TABLET ORAL ONCE
Status: COMPLETED | OUTPATIENT
Start: 2022-10-17 | End: 2022-10-17

## 2022-10-17 RX ADMIN — ACETAMINOPHEN 1000 MG: 500 TABLET, FILM COATED ORAL at 03:10

## 2022-10-17 NOTE — NURSING
[facility]  Department of Obstetrics and Gynecology  Labor & Delivery Triage Assessment    PATIENT NAME: Boris Arambula  MRN: [id number]  TODAY'S DATE: 10/17/2022    CHIEF COMPLAINT: Swelling      OB History    Para Term  AB Living   1 0 0 0 0 0   SAB IAB Ectopic Multiple Live Births   0 0 0 0 0      # Outcome Date GA Lbr Chito/2nd Weight Sex Delivery Anes PTL Lv   1 Current              No past medical history on file.  No past surgical history on file.      VITAL SIGNS - ABNORMAL VITALS INCLUDE TEMP >100.4,RR <12 or >26, SUSTAINED MATERNAL PULSE <60 or >120     VITAL SIGNS (Most Recent)  Temp: 98.3 °F (36.8 °C) (10/17/22 1521)  Resp: 18 (10/17/22 1421)    VITAL SIGNS     normal  HEADACHE    yes     VOMITING    no  VISUAL DISTURBANCES  no  EPIGASTRIC PAIN        no  PROTEINURIA 2+ or MORE             no   EDEMA FACE/EXTREMITIES           yes    FETAL MOVEMENT     FETAL MOVEMENT: Active  FETAL HEART RATE BASELINE = 125  normal  FETAL HEART RATE VARIABILITY:  Moderate  FETAL HEART RATE ACCELERATIONS FOR GESTATIONAL AGE: present  FETAL HEART RATE DECELERATIONS: n/a    ABDOMINAL PAIN/CRAMPING/CONTRACTIONS     Patient is not complaining of abdominal pain/cramping/contractions.    RUPTURE OF MEMBRANES OR LEAKING OF AMNIOTIC FLUID     Patient denies ROM or leaking of amniotic fluid.    VAGINAL BLEEDING     Patient denies vaginal bleeding.    VAGINAL EXAM     DILATION:  n/a  STATION:  n/a  EFFACEMENT:  n/a  PRESENTATION:  n/a    VAGINAL EXAM DEFERRED DUE TO:  Not in Labor    PAIN PRESENT ON ARRIVAL     ONSET:   0900 today  LOCATION:  head  PAIN SCALE (0-10):  5  DESCRIPTION: aching    EXACERBATION OF CHRONIC CONDITION (i.e. DM, ASTHMA, HTN)     N/a    PATIENT DISPOSITION     Discharged Home      Dr Medley  notified at 1500 of the above assessment.    Viki Berry RN  [facility]  10/17/2022

## 2022-10-19 ENCOUNTER — PATIENT MESSAGE (OUTPATIENT)
Dept: OBSTETRICS AND GYNECOLOGY | Facility: CLINIC | Age: 22
End: 2022-10-19
Payer: MEDICAID

## 2022-11-04 ENCOUNTER — PROCEDURE VISIT (OUTPATIENT)
Dept: MATERNAL FETAL MEDICINE | Facility: CLINIC | Age: 22
End: 2022-11-04
Payer: MEDICAID

## 2022-11-04 ENCOUNTER — ROUTINE PRENATAL (OUTPATIENT)
Dept: OBSTETRICS AND GYNECOLOGY | Facility: CLINIC | Age: 22
End: 2022-11-04
Payer: MEDICAID

## 2022-11-04 VITALS
DIASTOLIC BLOOD PRESSURE: 73 MMHG | BODY MASS INDEX: 33.96 KG/M2 | WEIGHT: 173.88 LBS | SYSTOLIC BLOOD PRESSURE: 127 MMHG | HEART RATE: 92 BPM

## 2022-11-04 DIAGNOSIS — O44.40 LOW-LYING PLACENTA: ICD-10-CM

## 2022-11-04 DIAGNOSIS — R12 HEARTBURN DURING PREGNANCY, ANTEPARTUM: ICD-10-CM

## 2022-11-04 DIAGNOSIS — O26.899 HEARTBURN DURING PREGNANCY, ANTEPARTUM: ICD-10-CM

## 2022-11-04 DIAGNOSIS — Z3A.25 25 WEEKS GESTATION OF PREGNANCY: ICD-10-CM

## 2022-11-04 DIAGNOSIS — Z3A.32 32 WEEKS GESTATION OF PREGNANCY: Primary | ICD-10-CM

## 2022-11-04 PROBLEM — Z3A.27 27 WEEKS GESTATION OF PREGNANCY: Status: RESOLVED | Noted: 2022-10-06 | Resolved: 2022-11-04

## 2022-11-04 PROCEDURE — 59426 PR ANTEPARTUM CARE ONLY, >7 VISITS: ICD-10-PCS | Mod: TH,S$GLB,, | Performed by: ADVANCED PRACTICE MIDWIFE

## 2022-11-04 PROCEDURE — 76816 OB US FOLLOW-UP PER FETUS: CPT | Mod: S$GLB,,, | Performed by: OBSTETRICS & GYNECOLOGY

## 2022-11-04 PROCEDURE — 76816 US OB/GYN PROCEDURE (VIEWPOINT): ICD-10-PCS | Mod: S$GLB,,, | Performed by: OBSTETRICS & GYNECOLOGY

## 2022-11-04 PROCEDURE — 76817 TRANSVAGINAL US OBSTETRIC: CPT | Mod: S$GLB,,, | Performed by: OBSTETRICS & GYNECOLOGY

## 2022-11-04 PROCEDURE — 76817 US OB/GYN PROCEDURE (VIEWPOINT): ICD-10-PCS | Mod: S$GLB,,, | Performed by: OBSTETRICS & GYNECOLOGY

## 2022-11-04 PROCEDURE — 59426 ANTEPARTUM CARE ONLY: CPT | Mod: TH,S$GLB,, | Performed by: ADVANCED PRACTICE MIDWIFE

## 2022-11-04 RX ORDER — FAMOTIDINE 20 MG/1
20 TABLET, FILM COATED ORAL 2 TIMES DAILY
Qty: 60 TABLET | Refills: 2 | Status: SHIPPED | OUTPATIENT
Start: 2022-11-04 | End: 2023-11-04

## 2022-11-04 NOTE — PROGRESS NOTES
2022  22 y.o.  at 27 + 6/7 d Estimated Date of Delivery: 22, per US at 9 weeks. EDC changed per US.  OB History    Para Term  AB Living   1             SAB IAB Ectopic Multiple Live Births                  # Outcome Date GA Lbr Chito/2nd Weight Sex Delivery Anes PTL Lv   1 Current               Obstetric Comments   States low lying placenta       Here for scheduled TERRENCE visit. Doing well.  No lof/brvb, dysuria, fever/chills, or abdominal pain. + FM. No S&S of pre eclampsia. Calm, pleasant, NAD. ROS negative with exception of aforementioned:  C/O heartburn not relieved with TUMS or dietary changes.  Anatomy scan shows low lying placenta, 1.3 cm from cervical os. Pelvic rest reviewed. Bleeding precautions reviewed. Pt denies VB. We discussed follow up at 32 weeks for low lying placenta. Scheduled for 22, today.    History  OBHX:    + UTI, porsche= no growth    PMXHX:  Denies major illness/injury    ALLERGY:  Amoxicillin    SURGHX:  None    SOCHX:  FOB involved  Denies tobacco, etoh or substance use in pregnancy.    LABS:  O pos abs neg  HIV neg  Rubella immune  RPR non reactive  Sickle Screen neg  HEP A B C neg  +UTI, porsche = no growth  Neg GC CHL  Panorama low risk male x 3  AFP negative  CBC/GTT today      Review of Systems:  General ROS: negative for headache or visual changes  Breast ROS: negative for breast lumps  Gastrointestinal ROS: negative for constipation, diarrhea or nausea/vomiting  Musculoskeletal ROS: negative for pain in joints or swelling in face or hands.   Neurological ROS: negative for - headaches, numbness/tingling or visual changes      Physical Exam:  /73   Pulse 92   Wt 78.9 kg (173 lb 14.4 oz)   LMP 2022 (Approximate)   BMI 33.96 kg/m²   FHT: Fetal Heart Rate: 140s    Constitutional: She is oriented to person, place, and time. She appears well-developed and well-nourished. No distress.   Pulmonary/Chest: Effort normal. No respiratory  distress  Abdominal: Soft, gravid, nontender. No rebound and no guarding. Fundal Height: Fundal Height (cm): 30 cmS=D   Genitourinary: Deferred   Musculoskeletal: Normal range of motion. Minimal peripheral edema.   Neurological: She is alert and oriented to person, place, and time. Coordination normal. Gait smooth and steady  Skin: Skin is warm and dry. She is not diaphoretic.  Psychiatric: She has a normal mood and affect.      Assessment:   22 y.o., at 32 weeks Gestation   Patient Active Problem List   Diagnosis    Low-lying placenta    32 weeks gestation of pregnancy    Heartburn during pregnancy, antepartum     Current Outpatient Medications on File Prior to Visit   Medication Sig Dispense Refill    loratadine (CLARITIN) 10 mg tablet Take 10 mg by mouth.      prenatal vit/iron fum/folic ac (PRENATAL 1+1 ORAL) Take by mouth.       No current facility-administered medications on file prior to visit.       Plan:  1. S&S of PTL/PPROM and pre eclampsia reinforced. [facility] discussed.  2.   Continue daily PNV.  3.   RX for Pepcid discussed and sent to pharmacy on file.  4.   1 hour gtt/CBC wnl, discussed.  5.   US today for eval of placental location, keep appointment.   6.   TERRENCE in 2 weeks then weekly until delivery.

## 2022-11-11 ENCOUNTER — PATIENT MESSAGE (OUTPATIENT)
Dept: OBSTETRICS AND GYNECOLOGY | Facility: CLINIC | Age: 22
End: 2022-11-11
Payer: MEDICAID

## 2022-11-11 DIAGNOSIS — O21.9 NAUSEA AND VOMITING DURING PREGNANCY: Primary | ICD-10-CM

## 2022-11-11 RX ORDER — ONDANSETRON 4 MG/1
4 TABLET, ORALLY DISINTEGRATING ORAL EVERY 6 HOURS PRN
Qty: 30 TABLET | Refills: 3 | Status: SHIPPED | OUTPATIENT
Start: 2022-11-11

## 2022-11-11 RX ORDER — PROMETHAZINE HYDROCHLORIDE 12.5 MG/1
12.5 TABLET ORAL 4 TIMES DAILY
Qty: 30 TABLET | Refills: 1 | Status: SHIPPED | OUTPATIENT
Start: 2022-11-11

## 2022-11-17 ENCOUNTER — ROUTINE PRENATAL (OUTPATIENT)
Dept: OBSTETRICS AND GYNECOLOGY | Facility: CLINIC | Age: 22
End: 2022-11-17
Payer: MEDICAID

## 2022-11-17 VITALS
HEART RATE: 81 BPM | SYSTOLIC BLOOD PRESSURE: 126 MMHG | DIASTOLIC BLOOD PRESSURE: 63 MMHG | BODY MASS INDEX: 34.43 KG/M2 | WEIGHT: 176.31 LBS

## 2022-11-17 DIAGNOSIS — Z3A.33 33 WEEKS GESTATION OF PREGNANCY: Primary | ICD-10-CM

## 2022-11-17 DIAGNOSIS — O26.899 HEARTBURN DURING PREGNANCY, ANTEPARTUM: ICD-10-CM

## 2022-11-17 DIAGNOSIS — R12 HEARTBURN DURING PREGNANCY, ANTEPARTUM: ICD-10-CM

## 2022-11-17 PROBLEM — O44.40 LOW-LYING PLACENTA: Status: RESOLVED | Noted: 2022-09-16 | Resolved: 2022-11-17

## 2022-11-17 PROCEDURE — 59426 ANTEPARTUM CARE ONLY: CPT | Mod: TH,S$GLB,, | Performed by: ADVANCED PRACTICE MIDWIFE

## 2022-11-17 PROCEDURE — 59426 PR ANTEPARTUM CARE ONLY, >7 VISITS: ICD-10-PCS | Mod: TH,S$GLB,, | Performed by: ADVANCED PRACTICE MIDWIFE

## 2022-11-17 RX ORDER — ONDANSETRON 4 MG/1
4 TABLET, FILM COATED ORAL EVERY 6 HOURS PRN
Qty: 30 TABLET | Refills: 1 | Status: SHIPPED | OUTPATIENT
Start: 2022-11-17

## 2022-11-17 NOTE — PROGRESS NOTES
2022  22 y.o.  at 33 + 6/7 d Estimated Date of Delivery: 22, per US at 9 weeks. EDC changed per US.  OB History    Para Term  AB Living   1             SAB IAB Ectopic Multiple Live Births                  # Outcome Date GA Lbr Chito/2nd Weight Sex Delivery Anes PTL Lv   1 Current               Obstetric Comments   States low lying placenta       Here for scheduled TERRENCE visit. Doing well.  No lof/brvb, dysuria, fever/chills, or abdominal pain. + FM. No S&S of pre eclampsia. Calm, pleasant, NAD. ROS negative with exception of aforementioned:  Anatomy scan showed low lying placenta, 1.3 cm from cervical os. Repeat US 32 week, resolved, now 2. 4 cm from os.     History  OBHX:    + UTI, porsche= no growth    PMXHX:  Denies major illness/injury    ALLERGY:  Amoxicillin    SURGHX:  None    SOCHX:  FOB involved  Denies tobacco, etoh or substance use in pregnancy.    LABS:  O pos abs neg  HIV neg  Rubella immune  RPR non reactive  Sickle Screen neg  HEP A B C neg  +UTI, porsche = no growth  Neg GC CHL  Panorama low risk male x 3  AFP negative  CBC    1 hour gtt 116      Review of Systems:  General ROS: negative for headache or visual changes  Breast ROS: negative for breast lumps  Gastrointestinal ROS: negative for constipation, diarrhea or nausea/vomiting  Musculoskeletal ROS: negative for pain in joints or swelling in face or hands.   Neurological ROS: negative for - headaches, numbness/tingling or visual changes      Physical Exam:  /63   Pulse 81   Wt 80 kg (176 lb 4.8 oz)   LMP 2022 (Approximate)   BMI 34.43 kg/m²   FHT:  130s    Constitutional: She is oriented to person, place, and time. She appears well-developed and well-nourished. No distress.   Pulmonary/Chest: Effort normal. No respiratory distress  Abdominal: Soft, gravid, nontender. No rebound and no guarding. Fundal Height:  30 cm  Genitourinary: Deferred   Musculoskeletal: Normal range of motion.  Minimal peripheral edema.   Neurological: She is alert and oriented to person, place, and time. Coordination normal. Gait smooth and steady  Skin: Skin is warm and dry. She is not diaphoretic.  Psychiatric: She has a normal mood and affect.      Assessment:   22 y.o., at 33 + 6/7  weeks Gestation   Patient Active Problem List   Diagnosis    Low-lying placenta    32 weeks gestation of pregnancy    Heartburn during pregnancy, antepartum     Current Outpatient Medications on File Prior to Visit   Medication Sig Dispense Refill    famotidine (PEPCID) 20 MG tablet Take 1 tablet (20 mg total) by mouth 2 (two) times daily. 60 tablet 2    loratadine (CLARITIN) 10 mg tablet Take 10 mg by mouth.      ondansetron (ZOFRAN-ODT) 4 MG TbDL Take 1 tablet (4 mg total) by mouth every 6 (six) hours as needed (nausea). 30 tablet 3    prenatal vit/iron fum/folic ac (PRENATAL 1+1 ORAL) Take by mouth.      promethazine (PHENERGAN) 12.5 MG Tab Take 1 tablet (12.5 mg total) by mouth 4 (four) times daily. 30 tablet 1     No current facility-administered medications on file prior to visit.       Plan:  1. S&S of PTL/PPROM and pre eclampsia reinforced. [facility] discussed.  2.   Continue daily PNV and PRN Pepcid.  3.   1 hour gtt/CBC wnl, discussed.  5.   US for eval of placenta and growth discussed. No previa or low lying. Normal interval growth.  6.   [facility], IV analgesia or epidural.  6.   TERRENCE in weekly until delivery.

## 2022-11-23 ENCOUNTER — ROUTINE PRENATAL (OUTPATIENT)
Dept: OBSTETRICS AND GYNECOLOGY | Facility: CLINIC | Age: 22
End: 2022-11-23
Payer: MEDICAID

## 2022-11-23 VITALS
BODY MASS INDEX: 34.24 KG/M2 | WEIGHT: 175.31 LBS | DIASTOLIC BLOOD PRESSURE: 71 MMHG | SYSTOLIC BLOOD PRESSURE: 128 MMHG | HEART RATE: 109 BPM

## 2022-11-23 DIAGNOSIS — O26.899 HEARTBURN DURING PREGNANCY, ANTEPARTUM: ICD-10-CM

## 2022-11-23 DIAGNOSIS — Z3A.34 34 WEEKS GESTATION OF PREGNANCY: Primary | ICD-10-CM

## 2022-11-23 DIAGNOSIS — R12 HEARTBURN DURING PREGNANCY, ANTEPARTUM: ICD-10-CM

## 2022-11-23 DIAGNOSIS — O44.40 LOW-LYING PLACENTA: ICD-10-CM

## 2022-11-23 PROBLEM — Z3A.32 32 WEEKS GESTATION OF PREGNANCY: Status: RESOLVED | Noted: 2022-11-04 | Resolved: 2022-11-23

## 2022-11-23 PROBLEM — Z3A.33 33 WEEKS GESTATION OF PREGNANCY: Status: RESOLVED | Noted: 2022-11-17 | Resolved: 2022-11-23

## 2022-11-23 PROCEDURE — 59426 ANTEPARTUM CARE ONLY: CPT | Mod: TH,S$GLB,, | Performed by: ADVANCED PRACTICE MIDWIFE

## 2022-11-23 PROCEDURE — 59426 PR ANTEPARTUM CARE ONLY, >7 VISITS: ICD-10-PCS | Mod: TH,S$GLB,, | Performed by: ADVANCED PRACTICE MIDWIFE

## 2022-11-23 NOTE — PROGRESS NOTES
2022  22 y.o.  at 34+5/7 d Estimated Date of Delivery: 22, per US at 9 weeks. EDC changed per US.    Here for scheduled TERRENCE visit. Doing well.  No lof/brvb, dysuria, fever/chills, or abdominal pain. + FM. No S&S of pre eclampsia. Calm, pleasant, NAD. ROS negative with exception of aforementioned:  Anatomy scan showed low lying placenta, 1.3 cm from cervical os. Repeat US 32 week, resolved, now 2. 4 cm from os.     History  OBHX:    Resolved low lying placenta  + UTI, porsche= no growth    PMXHX:  Denies major illness/injury    ALLERGY:  Amoxicillin    SURGHX:  None    SOCHX:  FOB involved  Denies tobacco, etoh or substance use in pregnancy.    LABS:  O pos abs neg  HIV neg  Rubella immune  RPR non reactive  Sickle Screen neg  HEP A B C neg  +UTI, porsche = no growth  Neg GC CHL  Panorama low risk male x 3  AFP negative  CBC    1 hour gtt 116      Review of Systems:  General ROS: negative for headache or visual changes  Breast ROS: negative for breast lumps  Gastrointestinal ROS: negative for constipation, diarrhea or nausea/vomiting  Musculoskeletal ROS: negative for pain in joints or swelling in face or hands.   Neurological ROS: negative for - headaches, numbness/tingling or visual changes      Physical Exam:  /71   Pulse 109   Wt 79.5 kg (175 lb 4.8 oz)   LMP 2022 (Approximate)   BMI 34.24 kg/m²   FHT:  150s    Constitutional: She is oriented to person, place, and time. She appears well-developed and well-nourished. No distress.   Pulmonary/Chest: Effort normal. No respiratory distress  Abdominal: Soft, gravid, nontender. No rebound and no guarding. Fundal Height:  32 cm  Genitourinary: Deferred   Musculoskeletal: Normal range of motion. Minimal peripheral edema.   Neurological: She is alert and oriented to person, place, and time. Coordination normal. Gait smooth and steady  Skin: Skin is warm and dry. She is not diaphoretic.  Psychiatric: She has a normal mood and  affect.      Assessment:   22 y.o., at 34 + 57  weeks Gestation   Patient Active Problem List   Diagnosis    Heartburn during pregnancy, antepartum    34 weeks gestation of pregnancy     Current Outpatient Medications on File Prior to Visit   Medication Sig Dispense Refill    famotidine (PEPCID) 20 MG tablet Take 1 tablet (20 mg total) by mouth 2 (two) times daily. 60 tablet 2    loratadine (CLARITIN) 10 mg tablet Take 10 mg by mouth.      ondansetron (ZOFRAN) 4 MG tablet Take 1 tablet (4 mg total) by mouth every 6 (six) hours as needed for Nausea. 30 tablet 1    ondansetron (ZOFRAN-ODT) 4 MG TbDL Take 1 tablet (4 mg total) by mouth every 6 (six) hours as needed (nausea). 30 tablet 3    prenatal vit/iron fum/folic ac (PRENATAL 1+1 ORAL) Take by mouth.      promethazine (PHENERGAN) 12.5 MG Tab Take 1 tablet (12.5 mg total) by mouth 4 (four) times daily. 30 tablet 1     No current facility-administered medications on file prior to visit.       Plan:  1. S&S of PTL/PPROM and pre eclampsia reinforced. [facility] discussed.  2.   Continue daily PNV and PRN Pepcid.  3.   US for eval of placenta at 32 weeks, discussed previously. No previa or low lying. Normal interval growth. # 3 lb 15 oz. 23#/ 2.4 cm from os.  6.   [facility], IV analgesia or epidural.  6.   TERRENCE in weekly until delivery. Monitor fundal height. May need repeat growth at 36 weeks.             Patient/Caregiver provided printed discharge information.

## 2022-11-30 ENCOUNTER — ROUTINE PRENATAL (OUTPATIENT)
Dept: OBSTETRICS AND GYNECOLOGY | Facility: CLINIC | Age: 22
End: 2022-11-30
Payer: MEDICAID

## 2022-11-30 VITALS
WEIGHT: 176.69 LBS | SYSTOLIC BLOOD PRESSURE: 116 MMHG | BODY MASS INDEX: 34.51 KG/M2 | DIASTOLIC BLOOD PRESSURE: 64 MMHG

## 2022-11-30 DIAGNOSIS — O26.849 SIZE OF FETUS INCONSISTENT WITH DATES, ANTEPARTUM: ICD-10-CM

## 2022-11-30 DIAGNOSIS — O26.899 HEARTBURN DURING PREGNANCY, ANTEPARTUM: ICD-10-CM

## 2022-11-30 DIAGNOSIS — R12 HEARTBURN DURING PREGNANCY, ANTEPARTUM: ICD-10-CM

## 2022-11-30 DIAGNOSIS — Z3A.35 35 WEEKS GESTATION OF PREGNANCY: Primary | ICD-10-CM

## 2022-11-30 PROCEDURE — 59426 ANTEPARTUM CARE ONLY: CPT | Mod: TH,S$GLB,,

## 2022-11-30 PROCEDURE — 59426 PR ANTEPARTUM CARE ONLY, >7 VISITS: ICD-10-PCS | Mod: TH,S$GLB,,

## 2022-11-30 NOTE — PROGRESS NOTES
2022  22 y.o.  at 35+5/7 d Estimated Date of Delivery: 22, per US at 9 weeks. EDC changed per US.    Here for scheduled TERRENCE visit. Doing well.  No lof/brvb, dysuria, fever/chills, or abdominal pain. + FM. No S&S of pre eclampsia. Calm, pleasant, NAD. ROS negative with exception of aforementioned:  Anatomy scan showed low lying placenta, 1.3 cm from cervical os. Repeat US 32 week, resolved, now 2. 4 cm from os.     History  OBHX:    Resolved low lying placenta  + UTI, porsche= no growth    PMXHX:  Denies major illness/injury    ALLERGY:  Amoxicillin    SURGHX:  None    SOCHX:  FOB involved  Denies tobacco, etoh or substance use in pregnancy.    LABS:  O pos abs neg  HIV neg  Rubella immune  RPR non reactive  Sickle Screen neg  HEP A B C neg  +UTI, porsche = no growth  Neg GC CHL  Panorama low risk male x 3  AFP negative  CBC    1 hour gtt 116  GBS Next Visit      Review of Systems:  General ROS: negative for headache or visual changes  Breast ROS: negative for breast lumps  Gastrointestinal ROS: negative for constipation, diarrhea or nausea/vomiting  Musculoskeletal ROS: negative for pain in joints or swelling in face or hands.   Neurological ROS: negative for - headaches, numbness/tingling or visual changes      Physical Exam:  /64   Wt 80.2 kg (176 lb 11.2 oz)   LMP 2022 (Approximate)   BMI 34.51 kg/m²   FHT: Fetal Heart Rate: 120s    Constitutional: She is oriented to person, place, and time. She appears well-developed and well-nourished. No distress.   Pulmonary/Chest: Effort normal. No respiratory distress  Abdominal: Soft, gravid, nontender. No rebound and no guarding. Fundal Height: Fundal Height (cm): 33 cm S<D  Genitourinary: Deferred   Musculoskeletal: Normal range of motion. Minimal peripheral edema.   Neurological: She is alert and oriented to person, place, and time. Coordination normal. Gait smooth and steady  Skin: Skin is warm and dry. She is not  diaphoretic.  Psychiatric: She has a normal mood and affect.      Assessment:   22 y.o., at 35 + 57  weeks Gestation   Patient Active Problem List   Diagnosis    Heartburn during pregnancy, antepartum    Size of fetus inconsistent with dates, antepartum    35 weeks gestation of pregnancy     Current Outpatient Medications on File Prior to Visit   Medication Sig Dispense Refill    loratadine (CLARITIN) 10 mg tablet Take 10 mg by mouth.      ondansetron (ZOFRAN) 4 MG tablet Take 1 tablet (4 mg total) by mouth every 6 (six) hours as needed for Nausea. 30 tablet 1    prenatal vit/iron fum/folic ac (PRENATAL 1+1 ORAL) Take by mouth.      famotidine (PEPCID) 20 MG tablet Take 1 tablet (20 mg total) by mouth 2 (two) times daily. (Patient not taking: Reported on 11/30/2022) 60 tablet 2    ondansetron (ZOFRAN-ODT) 4 MG TbDL Take 1 tablet (4 mg total) by mouth every 6 (six) hours as needed (nausea). (Patient not taking: Reported on 11/30/2022) 30 tablet 3    promethazine (PHENERGAN) 12.5 MG Tab Take 1 tablet (12.5 mg total) by mouth 4 (four) times daily. (Patient not taking: Reported on 11/30/2022) 30 tablet 1     No current facility-administered medications on file prior to visit.       Plan:  S&S of PTL/PPROM and pre eclampsia reinforced. [facility] discussed.  2.   Continue daily PNV and PRN Pepcid.  3.   US for eval of placenta at 32 weeks, discussed previously. No previa or low lying. Normal interval growth. # 3 lb 15 oz. 23#/ 2.4 cm from os.  4.   Pt remains size less than dates. Infant palpates approx 5# at today's visit. Ordered additional growth ultrasound to confirm weight.   5.   Birth plan discussed, delivery at  [facility]. Bottlefeeding, IV analgesia or epidural.  6.   GBS Next Visit.  7.   TERRENCE weekly until delivery.

## 2022-12-04 PROBLEM — Z3A.34 34 WEEKS GESTATION OF PREGNANCY: Status: RESOLVED | Noted: 2022-11-23 | Resolved: 2022-12-04

## 2022-12-04 PROBLEM — Z3A.35 35 WEEKS GESTATION OF PREGNANCY: Status: ACTIVE | Noted: 2022-12-04

## 2022-12-04 PROBLEM — O26.849 SIZE OF FETUS INCONSISTENT WITH DATES, ANTEPARTUM: Status: ACTIVE | Noted: 2022-12-04

## 2022-12-07 ENCOUNTER — PATIENT MESSAGE (OUTPATIENT)
Dept: OBSTETRICS AND GYNECOLOGY | Facility: CLINIC | Age: 22
End: 2022-12-07

## 2022-12-07 ENCOUNTER — ROUTINE PRENATAL (OUTPATIENT)
Dept: OBSTETRICS AND GYNECOLOGY | Facility: CLINIC | Age: 22
End: 2022-12-07
Payer: MEDICAID

## 2022-12-07 VITALS
DIASTOLIC BLOOD PRESSURE: 75 MMHG | WEIGHT: 175 LBS | BODY MASS INDEX: 34.18 KG/M2 | HEART RATE: 101 BPM | SYSTOLIC BLOOD PRESSURE: 120 MMHG

## 2022-12-07 DIAGNOSIS — Z3A.36 36 WEEKS GESTATION OF PREGNANCY: ICD-10-CM

## 2022-12-07 DIAGNOSIS — O26.849 SIZE OF FETUS INCONSISTENT WITH DATES, ANTEPARTUM: Primary | ICD-10-CM

## 2022-12-07 PROCEDURE — 87081 CULTURE SCREEN ONLY: CPT | Performed by: ADVANCED PRACTICE MIDWIFE

## 2022-12-07 PROCEDURE — 59426 ANTEPARTUM CARE ONLY: CPT | Mod: TH,S$GLB,, | Performed by: ADVANCED PRACTICE MIDWIFE

## 2022-12-07 PROCEDURE — 59426 PR ANTEPARTUM CARE ONLY, >7 VISITS: ICD-10-PCS | Mod: TH,S$GLB,, | Performed by: ADVANCED PRACTICE MIDWIFE

## 2022-12-07 NOTE — PROGRESS NOTES
2022  22 y.o.  at 36+5/7 d Estimated Date of Delivery: 22, per US at 9 weeks. EDC changed per US.    Here for scheduled TERRENCE visit. Doing well.  No lof/brvb, dysuria, fever/chills, or abdominal pain. Good FM. No S&S of pre eclampsia. Calm, pleasant, NAD. ROS negative with exception of aforementioned:  Anatomy scan showed low lying placenta, 1.3 cm from cervical os. Repeat US 32 week, resolved, now 2. 4 cm from os.     History  OBHX:    Resolved low lying placenta  + UTI, porsche= no growth    PMXHX:  Denies major illness/injury    ALLERGY:  Amoxicillin    SURGHX:  None    SOCHX:  FOB involved  Denies tobacco, etoh or substance use in pregnancy.    LABS:  O pos abs neg  HIV neg  Rubella immune  RPR non reactive  Sickle Screen neg  HEP A B C neg  +UTI, porsche = no growth  Neg GC CHL  Panorama low risk male x 3  AFP negative  CBC    1 hour gtt 116  GBS today      Review of Systems:  General ROS: negative for headache or visual changes  Breast ROS: negative for breast lumps  Gastrointestinal ROS: negative for constipation, diarrhea or nausea/vomiting  Musculoskeletal ROS: negative for pain in joints or swelling in face or hands.   Neurological ROS: negative for - headaches, numbness/tingling or visual changes      Physical Exam:  /75   Pulse 101   Wt 79.4 kg (175 lb)   LMP 2022 (Approximate)   BMI 34.18 kg/m²   FHT:  150s    Constitutional: She is oriented to person, place, and time. She appears well-developed and well-nourished. No distress.   Pulmonary/Chest: Effort normal. No respiratory distress  Abdominal: Soft, gravid, nontender. No rebound and no guarding. Fundal Height:  33 cm  Genitourinary:GBS cx done. SVE closed/long, mid. No CMT. No blood or fluid on glove.  Musculoskeletal: Normal range of motion. Minimal peripheral edema.   Neurological: She is alert and oriented to person, place, and time. Coordination normal. Gait smooth and steady  Skin: Skin is warm and  dry. She is not diaphoretic.  Psychiatric: She has a normal mood and affect.      Assessment:   22 y.o., at 36 + 5 weeks Gestation   Patient Active Problem List   Diagnosis    Heartburn during pregnancy, antepartum    Size of fetus inconsistent with dates, antepartum    35 weeks gestation of pregnancy    36 weeks gestation of pregnancy     Current Outpatient Medications on File Prior to Visit   Medication Sig Dispense Refill    loratadine (CLARITIN) 10 mg tablet Take 10 mg by mouth.      prenatal vit/iron fum/folic ac (PRENATAL 1+1 ORAL) Take by mouth.      famotidine (PEPCID) 20 MG tablet Take 1 tablet (20 mg total) by mouth 2 (two) times daily. (Patient not taking: Reported on 11/30/2022) 60 tablet 2    ondansetron (ZOFRAN) 4 MG tablet Take 1 tablet (4 mg total) by mouth every 6 (six) hours as needed for Nausea. (Patient not taking: Reported on 12/7/2022) 30 tablet 1    ondansetron (ZOFRAN-ODT) 4 MG TbDL Take 1 tablet (4 mg total) by mouth every 6 (six) hours as needed (nausea). (Patient not taking: Reported on 11/30/2022) 30 tablet 3    promethazine (PHENERGAN) 12.5 MG Tab Take 1 tablet (12.5 mg total) by mouth 4 (four) times daily. (Patient not taking: Reported on 11/30/2022) 30 tablet 1     No current facility-administered medications on file prior to visit.       Plan:  1. S&S of PTL/PPROM and pre eclampsia reinforced. Strict [facility] discussed.  2.   Continue daily PNV and PRN Pepcid.  3.   US for eval of placenta at 32 weeks, discussed previously. No previa or low lying. Normal interval growth. # 3 lb 15 oz. 23#/ 2.4 cm from os.  6.   [facility], IV analgesia or epidural.  6.   Size less dates/discussed. Growth US and TERRENCE next week.

## 2022-12-12 LAB — BACTERIA SPEC AEROBE CULT: NORMAL

## 2022-12-16 ENCOUNTER — ROUTINE PRENATAL (OUTPATIENT)
Dept: OBSTETRICS AND GYNECOLOGY | Facility: CLINIC | Age: 22
End: 2022-12-16
Payer: MEDICAID

## 2022-12-16 ENCOUNTER — PROCEDURE VISIT (OUTPATIENT)
Dept: MATERNAL FETAL MEDICINE | Facility: CLINIC | Age: 22
End: 2022-12-16
Payer: MEDICAID

## 2022-12-16 VITALS
BODY MASS INDEX: 34.57 KG/M2 | WEIGHT: 177 LBS | SYSTOLIC BLOOD PRESSURE: 133 MMHG | DIASTOLIC BLOOD PRESSURE: 70 MMHG | HEART RATE: 87 BPM

## 2022-12-16 DIAGNOSIS — O26.849 SIZE OF FETUS INCONSISTENT WITH DATES, ANTEPARTUM: ICD-10-CM

## 2022-12-16 DIAGNOSIS — Z3A.38 38 WEEKS GESTATION OF PREGNANCY: ICD-10-CM

## 2022-12-16 DIAGNOSIS — O26.899 HEARTBURN DURING PREGNANCY, ANTEPARTUM: Primary | ICD-10-CM

## 2022-12-16 DIAGNOSIS — R12 HEARTBURN DURING PREGNANCY, ANTEPARTUM: Primary | ICD-10-CM

## 2022-12-16 DIAGNOSIS — O36.5990 FETAL GROWTH RESTRICTION ANTEPARTUM: ICD-10-CM

## 2022-12-16 PROBLEM — Z3A.35 35 WEEKS GESTATION OF PREGNANCY: Status: RESOLVED | Noted: 2022-12-04 | Resolved: 2022-12-16

## 2022-12-16 PROBLEM — Z3A.36 36 WEEKS GESTATION OF PREGNANCY: Status: RESOLVED | Noted: 2022-12-07 | Resolved: 2022-12-16

## 2022-12-16 PROCEDURE — 76820 UMBILICAL ARTERY ECHO: CPT | Mod: S$GLB,,, | Performed by: OBSTETRICS & GYNECOLOGY

## 2022-12-16 PROCEDURE — 76820 US OB/GYN PROCEDURE (VIEWPOINT): ICD-10-PCS | Mod: S$GLB,,, | Performed by: OBSTETRICS & GYNECOLOGY

## 2022-12-16 PROCEDURE — 76819 FETAL BIOPHYS PROFIL W/O NST: CPT | Mod: S$GLB,,, | Performed by: OBSTETRICS & GYNECOLOGY

## 2022-12-16 PROCEDURE — 76816 US OB/GYN PROCEDURE (VIEWPOINT): ICD-10-PCS | Mod: S$GLB,,, | Performed by: OBSTETRICS & GYNECOLOGY

## 2022-12-16 PROCEDURE — 76819 US OB/GYN PROCEDURE (VIEWPOINT): ICD-10-PCS | Mod: S$GLB,,, | Performed by: OBSTETRICS & GYNECOLOGY

## 2022-12-16 PROCEDURE — 59426 PR ANTEPARTUM CARE ONLY, >7 VISITS: ICD-10-PCS | Mod: TH,S$GLB,, | Performed by: ADVANCED PRACTICE MIDWIFE

## 2022-12-16 PROCEDURE — 76816 OB US FOLLOW-UP PER FETUS: CPT | Mod: S$GLB,,, | Performed by: OBSTETRICS & GYNECOLOGY

## 2022-12-16 PROCEDURE — 59426 ANTEPARTUM CARE ONLY: CPT | Mod: TH,S$GLB,, | Performed by: ADVANCED PRACTICE MIDWIFE

## 2022-12-16 NOTE — PROGRESS NOTES
2022  22 y.o.  at 36+5/7 d Estimated Date of Delivery: 22, per US at 9 weeks. EDC changed per US.    Here for scheduled TERRENCE visit. Doing well.  No lof/brvb, dysuria, fever/chills, or abdominal pain. Good FM. No S&S of pre eclampsia. Rare, lower abdominal cramps. Calm, pleasant, NAD. ROS negative with exception of aforementioned:  Anatomy scan showed low lying placenta, 1.3 cm from cervical os. Repeat US 32 week, resolved, now 2. 4 cm from os.     History  OBHX:    Resolved low lying placenta  + UTI, porsche= no growth    PMXHX:  Denies major illness/injury    ALLERGY:  Amoxicillin    SURGHX:  None    SOCHX:  FOB involved  Denies tobacco, etoh or substance use in pregnancy.    LABS:  O pos abs neg  HIV neg  Rubella immune  RPR non reactive  Sickle Screen neg  HEP A B C neg  +UTI, porsche = no growth  Neg GC CHL  Panorama low risk male x 3  AFP negative  CBC    1 hour gtt 116  GBS today      Review of Systems:  General ROS: negative for headache or visual changes  Breast ROS: negative for breast lumps  Gastrointestinal ROS: negative for constipation, diarrhea or nausea/vomiting  Musculoskeletal ROS: negative for pain in joints or swelling in face or hands.   Neurological ROS: negative for - headaches, numbness/tingling or visual changes      Physical Exam:  /70   Pulse 87   Wt 80.3 kg (177 lb)   LMP 2022 (Approximate)   BMI 34.57 kg/m²   FHT:  140s    Constitutional: She is oriented to person, place, and time. She appears well-developed and well-nourished. No distress.   Pulmonary/Chest: Effort normal. No respiratory distress  Abdominal: Soft, gravid, nontender. No rebound and no guarding. Fundal Height:  34 cm  Genitourinary: SVE ex os FT, int os, closed/long, posterior. No CMT. No blood or fluid on glove.  Musculoskeletal: Normal range of motion. Minimal peripheral edema.   Neurological: She is alert and oriented to person, place, and time. Coordination normal. Gait  smooth and steady  Skin: Skin is warm and dry. She is not diaphoretic.  Psychiatric: She has a normal mood and affect.      Assessment:   22 y.o., at 38 weeks Gestation   Patient Active Problem List   Diagnosis    Heartburn during pregnancy, antepartum    Size of fetus inconsistent with dates, antepartum    38 weeks gestation of pregnancy    Fetal growth restriction antepartum     Current Outpatient Medications on File Prior to Visit   Medication Sig Dispense Refill    famotidine (PEPCID) 20 MG tablet Take 1 tablet (20 mg total) by mouth 2 (two) times daily. 60 tablet 2    loratadine (CLARITIN) 10 mg tablet Take 10 mg by mouth.      ondansetron (ZOFRAN) 4 MG tablet Take 1 tablet (4 mg total) by mouth every 6 (six) hours as needed for Nausea. 30 tablet 1    ondansetron (ZOFRAN-ODT) 4 MG TbDL Take 1 tablet (4 mg total) by mouth every 6 (six) hours as needed (nausea). 30 tablet 3    prenatal vit/iron fum/folic ac (PRENATAL 1+1 ORAL) Take by mouth.      promethazine (PHENERGAN) 12.5 MG Tab Take 1 tablet (12.5 mg total) by mouth 4 (four) times daily. 30 tablet 1     No current facility-administered medications on file prior to visit.       Plan:  1. S&S of labor/srom and pre eclampsia reinforced. Strict [facility] discussed.  2.   Continue daily PNV and PRN Pepcid.  3.   US for eval of placenta at 32 weeks, discussed previously. No previa or low lying. Normal interval growth. # 3 lb 15 oz. 23#/ 2.4 cm from os.  4.    Growth US done today 12/16/22 at 38 weeks, IUGR AC<7%, # 6 lb 5 oz, 17% EFW, VTX. BPP 8/8, Normal doppler studies. Delivery before 39 weeks per [facility].  5.    US/IUGR discussed in depth with patient and partner as well as recommendation for delivery prior to 39 weeks. All questions answered and they agree with.   IOL scheduled for 12/20/22 at 10 PM, [facility]. Laborist, Dr. Nick and ROYAL Rodríguez to determine method for cervical ripening. Strict [facility] stressed.   TERRENCE with me 12/19/22 and we will recheck  cervix.

## 2022-12-19 ENCOUNTER — ROUTINE PRENATAL (OUTPATIENT)
Dept: OBSTETRICS AND GYNECOLOGY | Facility: CLINIC | Age: 22
End: 2022-12-19
Payer: MEDICAID

## 2022-12-19 VITALS — SYSTOLIC BLOOD PRESSURE: 134 MMHG | BODY MASS INDEX: 34.86 KG/M2 | WEIGHT: 178.5 LBS | DIASTOLIC BLOOD PRESSURE: 74 MMHG

## 2022-12-19 DIAGNOSIS — R12 HEARTBURN DURING PREGNANCY, ANTEPARTUM: ICD-10-CM

## 2022-12-19 DIAGNOSIS — O26.899 HEARTBURN DURING PREGNANCY, ANTEPARTUM: ICD-10-CM

## 2022-12-19 DIAGNOSIS — Z3A.38 38 WEEKS GESTATION OF PREGNANCY: Primary | ICD-10-CM

## 2022-12-19 DIAGNOSIS — O36.5990 FETAL GROWTH RESTRICTION ANTEPARTUM: ICD-10-CM

## 2022-12-19 PROBLEM — O26.849 SIZE OF FETUS INCONSISTENT WITH DATES, ANTEPARTUM: Status: RESOLVED | Noted: 2022-12-04 | Resolved: 2022-12-19

## 2022-12-19 PROCEDURE — 59426 PR ANTEPARTUM CARE ONLY, >7 VISITS: ICD-10-PCS | Mod: TH,S$GLB,,

## 2022-12-19 PROCEDURE — 59426 ANTEPARTUM CARE ONLY: CPT | Mod: TH,S$GLB,,

## 2022-12-19 NOTE — PROGRESS NOTES
2022  22 y.o.  at 38+3/7 d Estimated Date of Delivery: 22, per US at 9 weeks. EDC changed per US.    Here for scheduled TERRENCE visit. Doing well.  No lof/brvb, dysuria, fever/chills, or abdominal pain. Good FM. No S&S of pre eclampsia. Rare, lower abdominal cramps. Calm, pleasant, NAD. ROS negative with exception of aforementioned:  Anatomy scan showed low lying placenta, 1.3 cm from cervical os. Repeat US 32 week, resolved, now 2. 4 cm from os.     IUGR diagnosed at 38w0d. EFW 6#5oz 17%; AC 7%. BPP 8/8. S/D ratio 85%. IOL scheduled 2022 at 2200 at 38w4d.    History  OBHX:    Resolved low lying placenta  + UTI, porsche= no growth    PMXHX:  Denies major illness/injury    ALLERGY:  Amoxicillin    SURGHX:  None    SOCHX:  FOB involved  Denies tobacco, etoh or substance use in pregnancy.    LABS:  O pos abs neg  HIV neg  Rubella immune  RPR non reactive  Sickle Screen neg  HEP A B C neg  +UTI, porsche = no growth  Neg GC CHL  Panorama low risk male x 3  AFP negative  CBC    1 hour gtt 116  GBS Negative      Review of Systems:  General ROS: negative for headache or visual changes  Breast ROS: negative for breast lumps  Gastrointestinal ROS: negative for constipation, diarrhea or nausea/vomiting  Musculoskeletal ROS: negative for pain in joints or swelling in face or hands.   Neurological ROS: negative for - headaches, numbness/tingling or visual changes      Physical Exam:  /74   Wt 81 kg (178 lb 8 oz)   LMP 2022 (Approximate)   BMI 34.86 kg/m²   FHT: Fetal Heart Rate: 140s    Constitutional: She is oriented to person, place, and time. She appears well-developed and well-nourished. No distress.   Pulmonary/Chest: Effort normal. No respiratory distress  Abdominal: Soft, gravid, nontender. No rebound and no guarding. Fundal Height: Fundal Height (cm): 34 cmS<D  Genitourinary: SVE ex os FT, int os, closed/long, midposition. No CMT. No blood or fluid on  glove.  Musculoskeletal: Normal range of motion. Minimal peripheral edema.   Neurological: She is alert and oriented to person, place, and time. Coordination normal. Gait smooth and steady  Skin: Skin is warm and dry. She is not diaphoretic.  Psychiatric: She has a normal mood and affect.      Assessment:   22 y.o., at 38 3/7 weeks Gestation   Patient Active Problem List   Diagnosis    Heartburn during pregnancy, antepartum    38 weeks gestation of pregnancy    Fetal growth restriction antepartum     Current Outpatient Medications on File Prior to Visit   Medication Sig Dispense Refill    famotidine (PEPCID) 20 MG tablet Take 1 tablet (20 mg total) by mouth 2 (two) times daily. 60 tablet 2    loratadine (CLARITIN) 10 mg tablet Take 10 mg by mouth.      ondansetron (ZOFRAN) 4 MG tablet Take 1 tablet (4 mg total) by mouth every 6 (six) hours as needed for Nausea. 30 tablet 1    ondansetron (ZOFRAN-ODT) 4 MG TbDL Take 1 tablet (4 mg total) by mouth every 6 (six) hours as needed (nausea). 30 tablet 3    prenatal vit/iron fum/folic ac (PRENATAL 1+1 ORAL) Take by mouth.      promethazine (PHENERGAN) 12.5 MG Tab Take 1 tablet (12.5 mg total) by mouth 4 (four) times daily. 30 tablet 1     No current facility-administered medications on file prior to visit.       Plan:  S&S of labor/srom and pre eclampsia reinforced. Strict [facility] discussed.  2.   Continue daily PNV and PRN Pepcid.  3.   US for eval of placenta at 32 weeks, discussed previously. No previa or low lying. Normal interval growth. # 3 lb 15 oz. 23#/ 2.4 cm from os.  4.    Growth US done today 12/16/22 at 38 weeks, IUGR AC<7%, # 6 lb 5 oz, 17% EFW, VTX. BPP 8/8, Normal doppler studies. Delivery before 39 weeks per [facility].  5.    US/IUGR discussed in depth with patient and partner as well as recommendation for delivery prior to 39 weeks. All questions answered and they agree with.   IOL scheduled for 12/20/22 at 10 PM, SR GPT Lnd. Laborist, Dr. Nick and ROYAL Rodríguez to  determine method for cervical ripening. Strict [facility] [place] stressed.   6.    VE at 38 3/7 weeks 0/30/-2 firm, mid position. Will need cervidil or cytotec  7.    Pt to schedule 6 week postpartum visit.

## 2022-12-20 ENCOUNTER — OUTSIDE PLACE OF SERVICE (OUTPATIENT)
Dept: OBSTETRICS AND GYNECOLOGY | Facility: CLINIC | Age: 22
End: 2022-12-20
Payer: MEDICAID

## 2022-12-21 ENCOUNTER — OUTSIDE PLACE OF SERVICE (OUTPATIENT)
Dept: OBSTETRICS AND GYNECOLOGY | Facility: CLINIC | Age: 22
End: 2022-12-21

## 2022-12-21 ENCOUNTER — OUTSIDE PLACE OF SERVICE (OUTPATIENT)
Dept: OBSTETRICS AND GYNECOLOGY | Facility: CLINIC | Age: 22
End: 2022-12-21
Payer: MEDICAID

## 2022-12-21 PROCEDURE — 59409 PR OBSTETRICAL CARE,VAG DELIV ONLY: ICD-10-PCS | Mod: TH,,,

## 2022-12-21 PROCEDURE — 59409 OBSTETRICAL CARE: CPT | Mod: TH,,,

## 2023-11-27 PROBLEM — Z3A.38 38 WEEKS GESTATION OF PREGNANCY: Status: RESOLVED | Noted: 2022-12-16 | Resolved: 2023-11-27

## 2025-03-28 ENCOUNTER — OFFICE VISIT (OUTPATIENT)
Dept: URGENT CARE | Facility: CLINIC | Age: 25
End: 2025-03-28
Payer: MEDICAID

## 2025-03-28 VITALS
BODY MASS INDEX: 34.86 KG/M2 | OXYGEN SATURATION: 100 % | HEART RATE: 76 BPM | HEIGHT: 60 IN | SYSTOLIC BLOOD PRESSURE: 108 MMHG | RESPIRATION RATE: 18 BRPM | DIASTOLIC BLOOD PRESSURE: 74 MMHG | TEMPERATURE: 98 F

## 2025-03-28 DIAGNOSIS — R42 DIZZINESS: ICD-10-CM

## 2025-03-28 DIAGNOSIS — N39.0 URINARY TRACT INFECTION WITHOUT HEMATURIA, SITE UNSPECIFIED: ICD-10-CM

## 2025-03-28 DIAGNOSIS — R53.83 FATIGUE, UNSPECIFIED TYPE: Primary | ICD-10-CM

## 2025-03-28 LAB
B-HCG UR QL: NEGATIVE
BILIRUB UR QL STRIP: NEGATIVE
CTP QC/QA: YES
GLUCOSE SERPL-MCNC: 93 MG/DL (ref 70–110)
GLUCOSE UR QL STRIP: NEGATIVE
KETONES UR QL STRIP: NEGATIVE
LEUKOCYTE ESTERASE UR QL STRIP: POSITIVE
PH, POC UA: 6.5
POC BLOOD, URINE: NEGATIVE
POC NITRATES, URINE: POSITIVE
PROT UR QL STRIP: POSITIVE
SP GR UR STRIP: 1.02 (ref 1–1.03)
UROBILINOGEN UR STRIP-ACNC: 0.2 (ref 0.1–1.1)

## 2025-03-28 RX ORDER — NITROFURANTOIN 25; 75 MG/1; MG/1
100 CAPSULE ORAL 2 TIMES DAILY
Qty: 14 CAPSULE | Refills: 0 | Status: SHIPPED | OUTPATIENT
Start: 2025-03-28 | End: 2025-04-04

## 2025-03-28 NOTE — PROGRESS NOTES
Subjective:      Patient ID: Boris Arambula is a 24 y.o. female.    Vitals:  height is 5' (1.524 m). Her temperature is 98.3 °F (36.8 °C). Her blood pressure is 108/74 and her pulse is 76. Her respiration is 18 and oxygen saturation is 100%.     Chief Complaint: Fatigue    Fatigue and light headed xfew weeks. Pt states that she is not having any sob, chest pain or other symptoms at this time.    Fatigue  This is a new problem. The current episode started 1 to 4 weeks ago. Associated symptoms include fatigue.       Constitution: Positive for fatigue.   HENT: Negative.     Neck: neck negative.   Cardiovascular: Negative.    Eyes: Negative.    Respiratory: Negative.     Gastrointestinal: Negative.    Endocrine: negative.   Genitourinary: Negative.    Musculoskeletal: Negative.    Skin: Negative.    Allergic/Immunologic: Negative.    Neurological:  Positive for dizziness and light-headedness.   Hematologic/Lymphatic: Negative.    Psychiatric/Behavioral: Negative.        Objective:     Physical Exam   Constitutional: She is oriented to person, place, and time.   HENT:   Head: Normocephalic and atraumatic.   Ears:   Right Ear: External ear normal.   Left Ear: External ear normal.   Nose: Nose normal.   Eyes: Conjunctivae are normal. Pupils are equal, round, and reactive to light. Extraocular movement intact   Neck: Neck supple.   Cardiovascular: Normal rate, regular rhythm, normal heart sounds and normal pulses.   Pulmonary/Chest: Effort normal and breath sounds normal.   Abdominal: Normal appearance.   Musculoskeletal: Normal range of motion.         General: Normal range of motion.   Neurological: no focal deficit. She is alert, oriented to person, place, and time and at baseline.   Skin: Skin is warm.   Psychiatric: Her behavior is normal. Mood, judgment and thought content normal.   Nursing note and vitals reviewed.      Assessment:     1. Fatigue, unspecified type    2. Dizziness    3. Urinary tract infection  without hematuria, site unspecified        Plan:       Fatigue, unspecified type  -     Orthostatic vital signs  -     EKG 12-lead; Future  -     XR CHEST PA AND LATERAL; Future; Expected date: 03/28/2025  -     POCT Glucose, Hand-Held Device  -     POCT urine pregnancy  -     POCT Urinalysis, Dipstick, Manual, w/o Scope    Dizziness  -     Orthostatic vital signs  -     EKG 12-lead; Future  -     XR CHEST PA AND LATERAL; Future; Expected date: 03/28/2025  -     POCT Glucose, Hand-Held Device  -     POCT urine pregnancy  -     POCT Urinalysis, Dipstick, Manual, w/o Scope    Urinary tract infection without hematuria, site unspecified    Other orders  -     nitrofurantoin, macrocrystal-monohydrate, (MACROBID) 100 MG capsule; Take 1 capsule (100 mg total) by mouth 2 (two) times daily. for 7 days  Dispense: 14 capsule; Refill: 0    Discussed with patient follow up with pcp for further testing and evaluation. Discussed with pt for any new, worsening or continuing symptoms to report to er.

## 2025-03-28 NOTE — PATIENT INSTRUCTIONS
Recommend increased intake of fluids.    If you were prescribed antibiotics take them as directed to completion.    You may take azo OTC as directed for painful urination unless you were prescribe something for these symptoms by the provider.  Follow-up with PCP or return to clinic if no improvement in symptoms over the next 3 days.    You must understand that you've received an Urgent Care treatment only and that you may be released before all your medical problems are known or treated. You, the patient, will arrange for follow up care as instructed.  Follow up with your PCP or specialty clinic as directed in the next 1-2 weeks if not improved or as needed.  You can call [phone] to schedule an appointment with the appropriate provider.  If your condition worsens we recommend that you receive another evaluation at the emergency room immediately or contact your primary medical clinics after hours call service to discuss your concerns.  Please return here or go to the Emergency Department for any concerns or worsening of condition.  Please if you smoke please consider quitting. [facility] Smoke cessation hotline number is [phone], available at this number is free counseling and medications to live a healthier life!         If you were prescribed a narcotic or controlled medication, do not drive or operate heavy equipment or machinery while taking these medications.